# Patient Record
Sex: FEMALE | Race: WHITE | HISPANIC OR LATINO
[De-identification: names, ages, dates, MRNs, and addresses within clinical notes are randomized per-mention and may not be internally consistent; named-entity substitution may affect disease eponyms.]

---

## 2017-01-20 ENCOUNTER — APPOINTMENT (OUTPATIENT)
Dept: OBGYN | Facility: CLINIC | Age: 37
End: 2017-01-20

## 2017-02-03 ENCOUNTER — OTHER (OUTPATIENT)
Age: 37
End: 2017-02-03

## 2017-02-03 ENCOUNTER — APPOINTMENT (OUTPATIENT)
Dept: OBGYN | Facility: CLINIC | Age: 37
End: 2017-02-03

## 2017-02-05 ENCOUNTER — TRANSCRIPTION ENCOUNTER (OUTPATIENT)
Age: 37
End: 2017-02-05

## 2017-02-10 ENCOUNTER — APPOINTMENT (OUTPATIENT)
Dept: OBGYN | Facility: CLINIC | Age: 37
End: 2017-02-10

## 2017-03-24 ENCOUNTER — APPOINTMENT (OUTPATIENT)
Dept: OBGYN | Facility: CLINIC | Age: 37
End: 2017-03-24

## 2017-04-25 ENCOUNTER — RECORD ABSTRACTING (OUTPATIENT)
Age: 37
End: 2017-04-25

## 2017-04-25 DIAGNOSIS — Z87.59 PERSONAL HISTORY OF OTHER COMPLICATIONS OF PREGNANCY, CHILDBIRTH AND THE PUERPERIUM: ICD-10-CM

## 2017-04-25 DIAGNOSIS — B36.9 SUPERFICIAL MYCOSIS, UNSPECIFIED: ICD-10-CM

## 2017-04-25 DIAGNOSIS — Z12.72 ENCOUNTER FOR SCREENING FOR MALIGNANT NEOPLASM OF VAGINA: ICD-10-CM

## 2017-05-05 ENCOUNTER — APPOINTMENT (OUTPATIENT)
Dept: OBGYN | Facility: CLINIC | Age: 37
End: 2017-05-05

## 2017-07-14 ENCOUNTER — APPOINTMENT (OUTPATIENT)
Dept: OBGYN | Facility: CLINIC | Age: 37
End: 2017-07-14

## 2018-01-02 ENCOUNTER — TRANSCRIPTION ENCOUNTER (OUTPATIENT)
Age: 38
End: 2018-01-02

## 2018-02-05 ENCOUNTER — TRANSCRIPTION ENCOUNTER (OUTPATIENT)
Age: 38
End: 2018-02-05

## 2018-02-06 ENCOUNTER — TRANSCRIPTION ENCOUNTER (OUTPATIENT)
Age: 38
End: 2018-02-06

## 2018-02-07 ENCOUNTER — EMERGENCY (EMERGENCY)
Facility: HOSPITAL | Age: 38
LOS: 0 days | Discharge: HOME | End: 2018-02-07

## 2018-02-07 VITALS
OXYGEN SATURATION: 95 % | HEART RATE: 64 BPM | DIASTOLIC BLOOD PRESSURE: 92 MMHG | RESPIRATION RATE: 20 BRPM | TEMPERATURE: 97 F | SYSTOLIC BLOOD PRESSURE: 192 MMHG

## 2018-02-07 DIAGNOSIS — R51 HEADACHE: ICD-10-CM

## 2018-02-07 DIAGNOSIS — R09.81 NASAL CONGESTION: ICD-10-CM

## 2018-02-07 DIAGNOSIS — I10 ESSENTIAL (PRIMARY) HYPERTENSION: ICD-10-CM

## 2018-02-07 DIAGNOSIS — Z79.1 LONG TERM (CURRENT) USE OF NON-STEROIDAL ANTI-INFLAMMATORIES (NSAID): ICD-10-CM

## 2018-02-07 DIAGNOSIS — F17.200 NICOTINE DEPENDENCE, UNSPECIFIED, UNCOMPLICATED: ICD-10-CM

## 2018-02-07 DIAGNOSIS — Z79.899 OTHER LONG TERM (CURRENT) DRUG THERAPY: ICD-10-CM

## 2018-02-07 DIAGNOSIS — R05 COUGH: ICD-10-CM

## 2018-02-07 RX ORDER — KETOROLAC TROMETHAMINE 30 MG/ML
30 SYRINGE (ML) INJECTION ONCE
Qty: 0 | Refills: 0 | Status: DISCONTINUED | OUTPATIENT
Start: 2018-02-07 | End: 2018-02-07

## 2018-02-07 RX ORDER — ONDANSETRON 8 MG/1
8 TABLET, FILM COATED ORAL ONCE
Qty: 0 | Refills: 0 | Status: COMPLETED | OUTPATIENT
Start: 2018-02-07 | End: 2018-02-07

## 2018-02-07 RX ORDER — VALACYCLOVIR 500 MG/1
1 TABLET, FILM COATED ORAL
Qty: 21 | Refills: 0 | OUTPATIENT
Start: 2018-02-07 | End: 2018-02-13

## 2018-02-07 RX ORDER — IBUPROFEN 200 MG
1 TABLET ORAL
Qty: 30 | Refills: 0 | OUTPATIENT
Start: 2018-02-07

## 2018-02-07 RX ADMIN — Medication 30 MILLIGRAM(S): at 22:46

## 2018-02-07 RX ADMIN — ONDANSETRON 8 MILLIGRAM(S): 8 TABLET, FILM COATED ORAL at 22:52

## 2018-02-13 ENCOUNTER — OUTPATIENT (OUTPATIENT)
Dept: OUTPATIENT SERVICES | Facility: HOSPITAL | Age: 38
LOS: 1 days | Discharge: HOME | End: 2018-02-13

## 2018-02-13 DIAGNOSIS — Z00.00 ENCOUNTER FOR GENERAL ADULT MEDICAL EXAMINATION WITHOUT ABNORMAL FINDINGS: ICD-10-CM

## 2018-02-13 DIAGNOSIS — Z13.1 ENCOUNTER FOR SCREENING FOR DIABETES MELLITUS: ICD-10-CM

## 2018-02-17 ENCOUNTER — OUTPATIENT (OUTPATIENT)
Dept: OUTPATIENT SERVICES | Facility: HOSPITAL | Age: 38
LOS: 1 days | Discharge: HOME | End: 2018-02-17

## 2018-02-17 DIAGNOSIS — D64.9 ANEMIA, UNSPECIFIED: ICD-10-CM

## 2018-02-19 ENCOUNTER — OUTPATIENT (OUTPATIENT)
Dept: OUTPATIENT SERVICES | Facility: HOSPITAL | Age: 38
LOS: 1 days | Discharge: HOME | End: 2018-02-19

## 2018-02-19 DIAGNOSIS — D64.9 ANEMIA, UNSPECIFIED: ICD-10-CM

## 2018-04-15 ENCOUNTER — EMERGENCY (EMERGENCY)
Facility: HOSPITAL | Age: 38
LOS: 0 days | Discharge: HOME | End: 2018-04-15
Attending: EMERGENCY MEDICINE

## 2018-04-15 VITALS
SYSTOLIC BLOOD PRESSURE: 176 MMHG | TEMPERATURE: 97 F | RESPIRATION RATE: 17 BRPM | DIASTOLIC BLOOD PRESSURE: 81 MMHG | HEART RATE: 72 BPM | OXYGEN SATURATION: 100 %

## 2018-04-15 VITALS
SYSTOLIC BLOOD PRESSURE: 200 MMHG | HEART RATE: 65 BPM | RESPIRATION RATE: 18 BRPM | OXYGEN SATURATION: 100 % | TEMPERATURE: 98 F | DIASTOLIC BLOOD PRESSURE: 99 MMHG

## 2018-04-15 DIAGNOSIS — Z79.899 OTHER LONG TERM (CURRENT) DRUG THERAPY: ICD-10-CM

## 2018-04-15 DIAGNOSIS — R10.12 LEFT UPPER QUADRANT PAIN: ICD-10-CM

## 2018-04-15 DIAGNOSIS — I10 ESSENTIAL (PRIMARY) HYPERTENSION: ICD-10-CM

## 2018-04-15 DIAGNOSIS — R10.13 EPIGASTRIC PAIN: ICD-10-CM

## 2018-04-15 DIAGNOSIS — Z3A.01 LESS THAN 8 WEEKS GESTATION OF PREGNANCY: ICD-10-CM

## 2018-04-15 DIAGNOSIS — O26.891 OTHER SPECIFIED PREGNANCY RELATED CONDITIONS, FIRST TRIMESTER: ICD-10-CM

## 2018-04-15 DIAGNOSIS — Z79.1 LONG TERM (CURRENT) USE OF NON-STEROIDAL ANTI-INFLAMMATORIES (NSAID): ICD-10-CM

## 2018-04-15 LAB
ALBUMIN SERPL ELPH-MCNC: 3.5 G/DL — SIGNIFICANT CHANGE UP (ref 3.5–5.2)
ALP SERPL-CCNC: 56 U/L — SIGNIFICANT CHANGE UP (ref 30–115)
ALT FLD-CCNC: 18 U/L — SIGNIFICANT CHANGE UP (ref 0–41)
ANION GAP SERPL CALC-SCNC: 10 MMOL/L — SIGNIFICANT CHANGE UP (ref 7–14)
APPEARANCE UR: CLEAR — SIGNIFICANT CHANGE UP
AST SERPL-CCNC: 27 U/L — SIGNIFICANT CHANGE UP (ref 0–41)
BASOPHILS # BLD AUTO: 0.01 K/UL — SIGNIFICANT CHANGE UP (ref 0–0.2)
BASOPHILS NFR BLD AUTO: 0.2 % — SIGNIFICANT CHANGE UP (ref 0–1)
BILIRUB DIRECT SERPL-MCNC: <0.2 MG/DL — SIGNIFICANT CHANGE UP (ref 0–0.2)
BILIRUB DIRECT SERPL-MCNC: <0.2 MG/DL — SIGNIFICANT CHANGE UP (ref 0–0.2)
BILIRUB INDIRECT FLD-MCNC: >0.1 MG/DL — LOW (ref 0.2–1.2)
BILIRUB INDIRECT FLD-MCNC: >0.1 MG/DL — LOW (ref 0.2–1.2)
BILIRUB SERPL-MCNC: 0.3 MG/DL — SIGNIFICANT CHANGE UP (ref 0.2–1.2)
BILIRUB SERPL-MCNC: 0.3 MG/DL — SIGNIFICANT CHANGE UP (ref 0.2–1.2)
BILIRUB UR-MCNC: NEGATIVE — SIGNIFICANT CHANGE UP
BUN SERPL-MCNC: 11 MG/DL — SIGNIFICANT CHANGE UP (ref 10–20)
CALCIUM SERPL-MCNC: 8.7 MG/DL — SIGNIFICANT CHANGE UP (ref 8.5–10.1)
CHLORIDE SERPL-SCNC: 101 MMOL/L — SIGNIFICANT CHANGE UP (ref 98–110)
CO2 SERPL-SCNC: 22 MMOL/L — SIGNIFICANT CHANGE UP (ref 17–32)
COLOR SPEC: YELLOW — SIGNIFICANT CHANGE UP
CREAT SERPL-MCNC: 0.9 MG/DL — SIGNIFICANT CHANGE UP (ref 0.7–1.5)
DIFF PNL FLD: NEGATIVE — SIGNIFICANT CHANGE UP
EOSINOPHIL # BLD AUTO: 0.03 K/UL — SIGNIFICANT CHANGE UP (ref 0–0.7)
EOSINOPHIL NFR BLD AUTO: 0.6 % — SIGNIFICANT CHANGE UP (ref 0–8)
GLUCOSE SERPL-MCNC: 100 MG/DL — HIGH (ref 70–99)
GLUCOSE UR QL: NEGATIVE MG/DL — SIGNIFICANT CHANGE UP
HCT VFR BLD CALC: 36.5 % — LOW (ref 37–47)
HGB BLD-MCNC: 12.1 G/DL — SIGNIFICANT CHANGE UP (ref 12–16)
IMM GRANULOCYTES NFR BLD AUTO: 0 % — LOW (ref 0.1–0.3)
KETONES UR-MCNC: NEGATIVE — SIGNIFICANT CHANGE UP
LACTATE SERPL-SCNC: 1.3 MMOL/L — SIGNIFICANT CHANGE UP (ref 0.5–2.2)
LEUKOCYTE ESTERASE UR-ACNC: NEGATIVE — SIGNIFICANT CHANGE UP
LIDOCAIN IGE QN: 34 U/L — SIGNIFICANT CHANGE UP (ref 7–60)
LYMPHOCYTES # BLD AUTO: 2.46 K/UL — SIGNIFICANT CHANGE UP (ref 1.2–3.4)
LYMPHOCYTES # BLD AUTO: 49.2 % — SIGNIFICANT CHANGE UP (ref 20.5–51.1)
MCHC RBC-ENTMCNC: 27.4 PG — SIGNIFICANT CHANGE UP (ref 27–31)
MCHC RBC-ENTMCNC: 33.2 G/DL — SIGNIFICANT CHANGE UP (ref 32–37)
MCV RBC AUTO: 82.8 FL — SIGNIFICANT CHANGE UP (ref 81–99)
MONOCYTES # BLD AUTO: 0.38 K/UL — SIGNIFICANT CHANGE UP (ref 0.1–0.6)
MONOCYTES NFR BLD AUTO: 7.6 % — SIGNIFICANT CHANGE UP (ref 1.7–9.3)
NEUTROPHILS # BLD AUTO: 2.12 K/UL — SIGNIFICANT CHANGE UP (ref 1.4–6.5)
NEUTROPHILS NFR BLD AUTO: 42.4 % — SIGNIFICANT CHANGE UP (ref 42.2–75.2)
NITRITE UR-MCNC: NEGATIVE — SIGNIFICANT CHANGE UP
NRBC # BLD: 0 /100 WBCS — SIGNIFICANT CHANGE UP (ref 0–0)
PH UR: 6.5 — SIGNIFICANT CHANGE UP (ref 5–8)
PLATELET # BLD AUTO: 207 K/UL — SIGNIFICANT CHANGE UP (ref 130–400)
POTASSIUM SERPL-MCNC: 4.5 MMOL/L — SIGNIFICANT CHANGE UP (ref 3.5–5)
POTASSIUM SERPL-SCNC: 4.5 MMOL/L — SIGNIFICANT CHANGE UP (ref 3.5–5)
PROT SERPL-MCNC: 9.4 G/DL — HIGH (ref 6–8)
PROT UR-MCNC: (no result) MG/DL
RBC # BLD: 4.41 M/UL — SIGNIFICANT CHANGE UP (ref 4.2–5.4)
RBC # FLD: 14.3 % — SIGNIFICANT CHANGE UP (ref 11.5–14.5)
SODIUM SERPL-SCNC: 133 MMOL/L — LOW (ref 135–146)
SP GR SPEC: 1.02 — SIGNIFICANT CHANGE UP (ref 1.01–1.03)
TROPONIN T SERPL-MCNC: <0.01 NG/ML — SIGNIFICANT CHANGE UP
UROBILINOGEN FLD QL: 0.2 MG/DL — SIGNIFICANT CHANGE UP (ref 0.2–0.2)
WBC # BLD: 5 K/UL — SIGNIFICANT CHANGE UP (ref 4.8–10.8)
WBC # FLD AUTO: 5 K/UL — SIGNIFICANT CHANGE UP (ref 4.8–10.8)

## 2018-04-15 NOTE — ED PROVIDER NOTE - NS ED ROS FT
Eyes:  No visual changes, eye pain or discharge.  ENMT:  No hearing changes, pain, discharge or infections. No neck pain or stiffness.  Cardiac:  No chest pain, SOB or edema.  Respiratory:  No cough or respiratory distress.   GI: Epigastric and LUQ abd pain.  No nausea, vomiting, diarrhea   :  No dysuria, frequency or burning.  MS:  No myalgia, muscle weakness, joint pain or back pain.  Neuro:  No headache or weakness.  No LOC.  Skin:  No skin rash.

## 2018-04-15 NOTE — ED PROVIDER NOTE - OBJECTIVE STATEMENT
38 y f , 6 weeks pregnant by lmp, htn pw abd pain. Epigastric and L upper quadrant abdominal pain. Sharp, intermittent. No exacerbating or alleviating factors. No change with eating. No loss of vaginal fluid, cramping, vaginal bleeding. Denies fever, chills, n/v, dysuria, hematuria, back pain, flank pain, headache, cp, sob.

## 2018-04-15 NOTE — ED PROVIDER NOTE - PHYSICAL EXAMINATION
CONSTITUTIONAL: Well-developed; well-nourished; in no acute distress.   SKIN: warm, dry  HEAD: Normocephalic; atraumatic.  EYES: normal sclera and conjunctiva   ENT: No nasal discharge; airway clear.  NECK: Supple; non tender.  CARD: S1, S2 normal; no murmurs, gallops, or rubs. Regular rate and rhythm.   RESP: No wheezes, rales or rhonchi.  ABD: soft ntnd, negative mcdermott's sign. No rebound, no guarding. No suprapubic ttp  EXT: Normal ROM.  No clubbing, cyanosis or edema.   LYMPH: No acute cervical adenopathy.  NEURO: Alert, oriented, grossly unremarkable  PSYCH: Cooperative, appropriate.

## 2018-04-16 NOTE — ED ADULT NURSE NOTE - OBJECTIVE STATEMENT
Pt took 2 home pregnancy tests with positive results. Pt complaining of abominal pain starting today.

## 2018-05-02 ENCOUNTER — APPOINTMENT (OUTPATIENT)
Dept: ANTEPARTUM | Facility: CLINIC | Age: 38
End: 2018-05-02

## 2018-05-02 ENCOUNTER — OUTPATIENT (OUTPATIENT)
Dept: OUTPATIENT SERVICES | Facility: HOSPITAL | Age: 38
LOS: 1 days | Discharge: HOME | End: 2018-05-02

## 2018-05-02 VITALS
TEMPERATURE: 97.8 F | DIASTOLIC BLOOD PRESSURE: 92 MMHG | HEART RATE: 68 BPM | SYSTOLIC BLOOD PRESSURE: 201 MMHG | OXYGEN SATURATION: 96 %

## 2018-05-02 DIAGNOSIS — Z30.42 ENCOUNTER FOR SURVEILLANCE OF INJECTABLE CONTRACEPTIVE: ICD-10-CM

## 2018-05-02 DIAGNOSIS — Z83.79 FAMILY HISTORY OF OTHER DISEASES OF THE DIGESTIVE SYSTEM: ICD-10-CM

## 2018-05-02 DIAGNOSIS — Z86.19 PERSONAL HISTORY OF OTHER INFECTIOUS AND PARASITIC DISEASES: ICD-10-CM

## 2018-05-02 LAB
BILIRUB UR QL STRIP: NEGATIVE
CLARITY UR: CLEAR
COLLECTION METHOD: NORMAL
GLUCOSE UR-MCNC: NEGATIVE
HCG UR QL: 0.2 EU/DL
HGB UR QL STRIP.AUTO: NEGATIVE
KETONES UR-MCNC: NEGATIVE
LEUKOCYTE ESTERASE UR QL STRIP: NEGATIVE
NITRITE UR QL STRIP: NEGATIVE
PH UR STRIP: 6
PROT UR STRIP-MCNC: NORMAL
SCHEDULED VISIT: YES
SP GR UR STRIP: 1.01
URINE ALBUMIN/PROTEIN: NORMAL
URINE GLUCOSE: NEGATIVE
URINE KETONES: NEGATIVE

## 2018-05-02 RX ORDER — MEDROXYPROGESTERONE ACETATE 150 MG/ML
150 INJECTION, SUSPENSION INTRAMUSCULAR
Refills: 0 | Status: DISCONTINUED | COMMUNITY
End: 2018-05-02

## 2018-05-03 DIAGNOSIS — O30.001 TWIN PREGNANCY, UNSPECIFIED NUMBER OF PLACENTA AND UNSPECIFIED NUMBER OF AMNIOTIC SACS, FIRST TRIMESTER: ICD-10-CM

## 2018-05-03 DIAGNOSIS — O98.711 HUMAN IMMUNODEFICIENCY VIRUS [HIV] DISEASE COMPLICATING PREGNANCY, FIRST TRIMESTER: ICD-10-CM

## 2018-05-03 DIAGNOSIS — O10.011 PRE-EXISTING ESSENTIAL HYPERTENSION COMPLICATING PREGNANCY, FIRST TRIMESTER: ICD-10-CM

## 2018-05-04 ENCOUNTER — OUTPATIENT (OUTPATIENT)
Dept: OUTPATIENT SERVICES | Facility: HOSPITAL | Age: 38
LOS: 1 days | Discharge: HOME | End: 2018-05-04

## 2018-05-04 DIAGNOSIS — B20 HUMAN IMMUNODEFICIENCY VIRUS [HIV] DISEASE: ICD-10-CM

## 2018-05-04 DIAGNOSIS — E66.8 OTHER OBESITY: ICD-10-CM

## 2018-05-04 DIAGNOSIS — I10 ESSENTIAL (PRIMARY) HYPERTENSION: ICD-10-CM

## 2018-05-04 DIAGNOSIS — O09.90 SUPERVISION OF HIGH RISK PREGNANCY, UNSPECIFIED, UNSPECIFIED TRIMESTER: ICD-10-CM

## 2018-05-04 LAB
24R-OH-CALCIDIOL SERPL-MCNC: 24 NG/ML — LOW (ref 30–80)
ALBUMIN SERPL ELPH-MCNC: 3.5 G/DL — SIGNIFICANT CHANGE UP (ref 3.5–5.2)
ALP SERPL-CCNC: 49 U/L — SIGNIFICANT CHANGE UP (ref 30–115)
ALT FLD-CCNC: 18 U/L — SIGNIFICANT CHANGE UP (ref 0–41)
ANION GAP SERPL CALC-SCNC: 11 MMOL/L — SIGNIFICANT CHANGE UP (ref 7–14)
APPEARANCE UR: CLEAR — SIGNIFICANT CHANGE UP
AST SERPL-CCNC: 20 U/L — SIGNIFICANT CHANGE UP (ref 0–41)
BACTERIA # UR AUTO: (no result)
BILIRUB SERPL-MCNC: 0.4 MG/DL — SIGNIFICANT CHANGE UP (ref 0.2–1.2)
BILIRUB UR-MCNC: NEGATIVE — SIGNIFICANT CHANGE UP
BUN SERPL-MCNC: 10 MG/DL — SIGNIFICANT CHANGE UP (ref 10–20)
CALCIUM SERPL-MCNC: 8.7 MG/DL — SIGNIFICANT CHANGE UP (ref 8.5–10.1)
CHLORIDE SERPL-SCNC: 99 MMOL/L — SIGNIFICANT CHANGE UP (ref 98–110)
CHOLEST SERPL-MCNC: 149 MG/DL — SIGNIFICANT CHANGE UP (ref 100–200)
CO2 SERPL-SCNC: 24 MMOL/L — SIGNIFICANT CHANGE UP (ref 17–32)
COLOR SPEC: YELLOW — SIGNIFICANT CHANGE UP
CREAT SERPL-MCNC: 0.7 MG/DL — SIGNIFICANT CHANGE UP (ref 0.7–1.5)
DIFF PNL FLD: NEGATIVE — SIGNIFICANT CHANGE UP
DRUG SCREEN 1, URINE RESULT: SIGNIFICANT CHANGE UP
EPI CELLS # UR: (no result) /HPF
ESTIMATED AVERAGE GLUCOSE: 111 MG/DL — SIGNIFICANT CHANGE UP (ref 68–114)
GGT SERPL-CCNC: 13 U/L — SIGNIFICANT CHANGE UP (ref 1–40)
GLUCOSE SERPL-MCNC: 91 MG/DL — SIGNIFICANT CHANGE UP (ref 70–99)
GLUCOSE UR QL: NEGATIVE MG/DL — SIGNIFICANT CHANGE UP
HBA1C BLD-MCNC: 5.5 % — SIGNIFICANT CHANGE UP (ref 4–5.6)
HCG UR QL: POSITIVE — SIGNIFICANT CHANGE UP
HCT VFR BLD CALC: 36 % — LOW (ref 37–47)
HDLC SERPL-MCNC: 47 MG/DL — SIGNIFICANT CHANGE UP (ref 40–125)
HGB BLD-MCNC: 12.1 G/DL — SIGNIFICANT CHANGE UP (ref 12–16)
KETONES UR-MCNC: NEGATIVE — SIGNIFICANT CHANGE UP
LDH SERPL L TO P-CCNC: 204 — SIGNIFICANT CHANGE UP (ref 50–242)
LEUKOCYTE ESTERASE UR-ACNC: NEGATIVE — SIGNIFICANT CHANGE UP
LIPID PNL WITH DIRECT LDL SERPL: 84 MG/DL — SIGNIFICANT CHANGE UP (ref 4–129)
MCHC RBC-ENTMCNC: 27.9 PG — SIGNIFICANT CHANGE UP (ref 27–31)
MCHC RBC-ENTMCNC: 33.6 G/DL — SIGNIFICANT CHANGE UP (ref 32–37)
MCV RBC AUTO: 83.1 FL — SIGNIFICANT CHANGE UP (ref 81–99)
NITRITE UR-MCNC: NEGATIVE — SIGNIFICANT CHANGE UP
NRBC # BLD: 0 /100 WBCS — SIGNIFICANT CHANGE UP (ref 0–0)
PH UR: 6.5 — SIGNIFICANT CHANGE UP (ref 5–8)
PLATELET # BLD AUTO: 219 K/UL — SIGNIFICANT CHANGE UP (ref 130–400)
POTASSIUM SERPL-MCNC: 4.1 MMOL/L — SIGNIFICANT CHANGE UP (ref 3.5–5)
POTASSIUM SERPL-SCNC: 4.1 MMOL/L — SIGNIFICANT CHANGE UP (ref 3.5–5)
PROT SERPL-MCNC: 8.9 G/DL — HIGH (ref 6–8)
PROT UR-MCNC: (no result) MG/DL
RBC # BLD: 4.33 M/UL — SIGNIFICANT CHANGE UP (ref 4.2–5.4)
RBC # FLD: 14 % — SIGNIFICANT CHANGE UP (ref 11.5–14.5)
SODIUM SERPL-SCNC: 134 MMOL/L — LOW (ref 135–146)
SP GR SPEC: 1.02 — SIGNIFICANT CHANGE UP (ref 1.01–1.03)
TOTAL CHOLESTEROL/HDL RATIO MEASUREMENT: 3.2 RATIO — LOW (ref 4–5.5)
TRIGL SERPL-MCNC: 151 MG/DL — HIGH (ref 10–149)
UROBILINOGEN FLD QL: 0.2 MG/DL — SIGNIFICANT CHANGE UP (ref 0.2–0.2)
WBC # BLD: 4.66 K/UL — LOW (ref 4.8–10.8)
WBC # FLD AUTO: 4.66 K/UL — LOW (ref 4.8–10.8)
WBC UR QL: SIGNIFICANT CHANGE UP /HPF

## 2018-05-05 LAB
4/8 RATIO: 0.4 RATIO — LOW (ref 1.2–3.4)
ABS CD8: 1276 /UL — HIGH (ref 206–494)
AMPHET UR-MCNC: NEGATIVE — SIGNIFICANT CHANGE UP
BARBITURATES UR SCN-MCNC: NEGATIVE — SIGNIFICANT CHANGE UP
BENZODIAZ UR-MCNC: NEGATIVE — SIGNIFICANT CHANGE UP
CD16+CD56+ CELLS NFR BLD: 5 % — SIGNIFICANT CHANGE UP (ref 4–20)
CD16+CD56+ CELLS NFR SPEC: 102 /UL — SIGNIFICANT CHANGE UP (ref 89–385)
CD19 BLASTS SPEC-ACNC: 181 /UL — SIGNIFICANT CHANGE UP (ref 72–502)
CD19 BLASTS SPEC-ACNC: 9 % — LOW (ref 10–28)
CD3 BLASTS SPEC-ACNC: 1776 /UL — SIGNIFICANT CHANGE UP (ref 800–2012)
CD3 BLASTS SPEC-ACNC: 86 % — HIGH (ref 59–81)
CD4 %: 23 % — SIGNIFICANT CHANGE UP (ref 42–58)
CD8 %: 61 % — SIGNIFICANT CHANGE UP (ref 14–30)
CMV IGG FLD QL: >10 U/ML — HIGH
CMV IGG SERPL-IMP: POSITIVE
COCAINE METAB.OTHER UR-MCNC: NEGATIVE — SIGNIFICANT CHANGE UP
HAV IGG SER QL IA: REACTIVE
HAV IGM SER-ACNC: SIGNIFICANT CHANGE UP
HBV CORE AB SER-ACNC: SIGNIFICANT CHANGE UP
HBV SURFACE AB SER-ACNC: REACTIVE
HBV SURFACE AG SER-ACNC: SIGNIFICANT CHANGE UP
HCV AB S/CO SERPL IA: 0.14 S/CO — SIGNIFICANT CHANGE UP
HCV AB SERPL-IMP: SIGNIFICANT CHANGE UP
HSV1 IGG SER-ACNC: 0.86 INDEX — SIGNIFICANT CHANGE UP
HSV1 IGG SERPL QL IA: NEGATIVE — SIGNIFICANT CHANGE UP
HSV2 IGG FLD-ACNC: 0.13 INDEX — SIGNIFICANT CHANGE UP
HSV2 IGG SERPL QL IA: NEGATIVE — SIGNIFICANT CHANGE UP
METHADONE UR-MCNC: NEGATIVE — SIGNIFICANT CHANGE UP
MEV IGG SER-ACNC: 174 AU/ML — SIGNIFICANT CHANGE UP
MEV IGG+IGM SER-IMP: POSITIVE — SIGNIFICANT CHANGE UP
MUV AB SER-ACNC: POSITIVE — SIGNIFICANT CHANGE UP
MUV IGG FLD-ACNC: 62.7 AU/ML — SIGNIFICANT CHANGE UP
OPIATES UR-MCNC: NEGATIVE — SIGNIFICANT CHANGE UP
PCP UR-MCNC: NEGATIVE — SIGNIFICANT CHANGE UP
PROPOXYPHENE QUALITATIVE URINE RESULT: NEGATIVE — SIGNIFICANT CHANGE UP
RUBV IGG SER-ACNC: 1.7 INDEX — SIGNIFICANT CHANGE UP
RUBV IGG SER-IMP: POSITIVE — SIGNIFICANT CHANGE UP
T GONDII IGG SER QL: <3 IU/ML — SIGNIFICANT CHANGE UP
T GONDII IGG SER QL: NEGATIVE — SIGNIFICANT CHANGE UP
T PALLIDUM AB TITR SER: NEGATIVE — SIGNIFICANT CHANGE UP
T-CELL CD4 SUBSET PNL BLD: 484 /UL — LOW (ref 495–1312)
THC UR QL: NEGATIVE — SIGNIFICANT CHANGE UP
VZV IGG FLD QL IA: 748.7 INDEX — SIGNIFICANT CHANGE UP
VZV IGG FLD QL IA: POSITIVE — SIGNIFICANT CHANGE UP

## 2018-05-06 LAB
M TB TUBERC IFN-G BLD QL: 0 IU/ML — SIGNIFICANT CHANGE UP
M TB TUBERC IFN-G BLD QL: 0.06 IU/ML — SIGNIFICANT CHANGE UP
M TB TUBERC IFN-G BLD QL: NEGATIVE — SIGNIFICANT CHANGE UP
MITOGEN IGNF BCKGRD COR BLD-ACNC: >10 IU/ML — SIGNIFICANT CHANGE UP

## 2018-05-07 ENCOUNTER — LABORATORY RESULT (OUTPATIENT)
Age: 38
End: 2018-05-07

## 2018-05-07 LAB
C TRACH RRNA SPEC QL NAA+PROBE: SIGNIFICANT CHANGE UP
HIV 1+2 AB+HIV1 P24 AG SERPL QL IA: REACTIVE
HIV1+2 AB SPEC QL: (no result)
HIV1+2 AB SPEC QL: SIGNIFICANT CHANGE UP
SPECIMEN SOURCE: SIGNIFICANT CHANGE UP

## 2018-05-09 LAB
A VAGINAE DNA VAG QL NAA+PROBE: ABNORMAL
ALBUMIN SERPL ELPH-MCNC: 3.6 G/DL
ALP BLD-CCNC: 43 U/L
ALT SERPL-CCNC: 18 U/L
ANION GAP SERPL CALC-SCNC: 13 MMOL/L
APPEARANCE: CLEAR
AST SERPL-CCNC: 24 U/L
BACTERIA UR CULT: NORMAL
BASOPHILS # BLD AUTO: 0.01 K/UL
BASOPHILS NFR BLD AUTO: 0.2 %
BILIRUB SERPL-MCNC: 0.5 MG/DL
BILIRUBIN URINE: NEGATIVE
BLOOD URINE: NEGATIVE
BUN SERPL-MCNC: 9 MG/DL
BVAB2 DNA VAG QL NAA+PROBE: NORMAL
C KRUSEI DNA VAG QL NAA+PROBE: NEGATIVE
C TRACH RRNA SPEC QL NAA+PROBE: NEGATIVE
CALCIUM SERPL-MCNC: 9.3 MG/DL
CHLORIDE SERPL-SCNC: 99 MMOL/L
CMV IGG SERPL QL: >10 U/ML
CMV IGG SERPL-IMP: POSITIVE
CMV IGM SERPL QL: <8 AU/ML
CMV IGM SERPL QL: NEGATIVE
CO2 SERPL-SCNC: 22 MMOL/L
COLOR: YELLOW
CREAT 24H UR-MCNC: 2 MG/24 H
CREAT ?TM UR-MCNC: 287 MG/DL
CREAT SERPL-MCNC: 0.8 MG/DL
EOSINOPHIL # BLD AUTO: 0.02 K/UL
EOSINOPHIL NFR BLD AUTO: 0.4 %
GLUCOSE QUALITATIVE U: NEGATIVE MG/DL
GLUCOSE SERPL-MCNC: 91 MG/DL
HBV SURFACE AG SERPL QL IA: NONREACTIVE
HCT VFR BLD CALC: 34.6 %
HCV AB SER QL: NONREACTIVE
HCV S/CO RATIO: 0.16 S/CO
HGB BLD-MCNC: 11.3 G/DL
IMM GRANULOCYTES NFR BLD AUTO: 0 %
KETONES URINE: ABNORMAL
LDH SERPL-CCNC: 184
LEUKOCYTE ESTERASE URINE: NEGATIVE
LYMPHOCYTES # BLD AUTO: 1.97 K/UL
LYMPHOCYTES NFR BLD AUTO: 41.4 %
MAN DIFF?: NORMAL
MCHC RBC-ENTMCNC: 27.8 PG
MCHC RBC-ENTMCNC: 32.7 G/DL
MCV RBC AUTO: 85 FL
MEGA1 DNA VAG QL NAA+PROBE: ABNORMAL
MONOCYTES # BLD AUTO: 0.32 K/UL
MONOCYTES NFR BLD AUTO: 6.7 %
N GONORRHOEA RRNA SPEC QL NAA+PROBE: NEGATIVE
NEUTROPHILS # BLD AUTO: 2.44 K/UL
NEUTROPHILS NFR BLD AUTO: 51.3 %
NITRITE URINE: NEGATIVE
PH URINE: 6
PLATELET # BLD AUTO: 238 K/UL
POTASSIUM SERPL-SCNC: 4.4 MMOL/L
PROT 24H UR-MRATE: 51 MG/DL
PROT ?TM UR-MCNC: 24 HR
PROT SERPL-MCNC: 9 G/DL
PROT UR-MCNC: 268 MG/24 H
PROTEIN URINE: ABNORMAL MG/DL
RBC # BLD: 4.07 M/UL
RBC # FLD: 14.4 %
RUBV IGG FLD-ACNC: 1.6 INDEX
RUBV IGG SER-IMP: POSITIVE
SODIUM SERPL-SCNC: 134 MMOL/L
SPECIFIC GRAVITY URINE: 1.02
SPECIMEN VOL 24H UR: 525 ML
T GONDII AB SER-IMP: NEGATIVE
T GONDII AB SER-IMP: NEGATIVE
T GONDII IGG SER QL: <3 IU/ML
T GONDII IGM SER QL: <3 AU/ML
T PALLIDUM AB SER QL IA: NEGATIVE
T VAGINALIS RRNA SPEC QL NAA+PROBE: NEGATIVE
URATE SERPL-MCNC: 3.5 MG/DL
UROBILINOGEN URINE: NEGATIVE MG/DL
VZV AB TITR SER: POSITIVE
VZV IGG SER IF-ACNC: 611.9 INDEX
WBC # FLD AUTO: 4.76 K/UL

## 2018-05-11 LAB
ADJUSTED MITOGEN: >10 IU/ML
ADJUSTED TB AG: -0.01 IU/ML
HGB A MFR BLD: 97.4 %
HGB A2 MFR BLD: 2.6 %
HGB FRACT BLD-IMP: NORMAL
HIV1 RNA # SERPL NAA+PROBE: SIGNIFICANT CHANGE UP
HIV1 RNA SERPL NAA+PROBE-LOG#: 2.59 LG COP/ML — SIGNIFICANT CHANGE UP
LEAD BLD-MCNC: 1 UG/DL
M TB IFN-G BLD-IMP: NEGATIVE
QUANTIFERON GOLD NIL: 0.04 IU/ML

## 2018-05-16 ENCOUNTER — OUTPATIENT (OUTPATIENT)
Dept: OUTPATIENT SERVICES | Facility: HOSPITAL | Age: 38
LOS: 1 days | Discharge: HOME | End: 2018-05-16

## 2018-05-16 DIAGNOSIS — O10.919 UNSPECIFIED PRE-EXISTING HYPERTENSION COMPLICATING PREGNANCY, UNSPECIFIED TRIMESTER: ICD-10-CM

## 2018-05-16 LAB
HIV1 RNA # SERPL NAA+PROBE: 83 COPIES/ML
VIRAL LOAD LOG: 1.92 LG COP/ML

## 2018-05-24 ENCOUNTER — MEDICATION RENEWAL (OUTPATIENT)
Age: 38
End: 2018-05-24

## 2018-06-01 ENCOUNTER — OUTPATIENT (OUTPATIENT)
Dept: OUTPATIENT SERVICES | Facility: HOSPITAL | Age: 38
LOS: 1 days | Discharge: HOME | End: 2018-06-01

## 2018-06-01 ENCOUNTER — APPOINTMENT (OUTPATIENT)
Dept: OBGYN | Facility: CLINIC | Age: 38
End: 2018-06-01

## 2018-06-01 ENCOUNTER — APPOINTMENT (OUTPATIENT)
Dept: ANTEPARTUM | Facility: CLINIC | Age: 38
End: 2018-06-01

## 2018-06-01 VITALS
WEIGHT: 292.5 LBS | OXYGEN SATURATION: 97 % | BODY MASS INDEX: 45.81 KG/M2 | HEART RATE: 66 BPM | TEMPERATURE: 97.2 F | DIASTOLIC BLOOD PRESSURE: 69 MMHG | SYSTOLIC BLOOD PRESSURE: 143 MMHG

## 2018-06-01 DIAGNOSIS — O30.049 TWIN PREGNANCY, DICHORIONIC/DIAMNIOTIC, UNSPECIFIED TRIMESTER: ICD-10-CM

## 2018-06-01 LAB
BILIRUB UR QL STRIP: NORMAL
CLARITY UR: CLEAR
COLLECTION METHOD: NORMAL
GLUCOSE UR-MCNC: NEGATIVE
HCG UR QL: 0.2 EU/DL
HGB UR QL STRIP.AUTO: NEGATIVE
KETONES UR-MCNC: NORMAL
LEUKOCYTE ESTERASE UR QL STRIP: NEGATIVE
NITRITE UR QL STRIP: NEGATIVE
OB COMMENTS: NORMAL
PH UR STRIP: 6
PROT UR STRIP-MCNC: NORMAL
SCHEDULED VISIT: YES
SP GR UR STRIP: 1.02
URINE ALBUMIN/PROTEIN: NORMAL
URINE GLUCOSE: NEGATIVE
URINE KETONES: NEGATIVE
WEEKS GESTATION: NORMAL

## 2018-06-04 ENCOUNTER — APPOINTMENT (OUTPATIENT)
Dept: OTOLARYNGOLOGY | Facility: CLINIC | Age: 38
End: 2018-06-04

## 2018-06-05 ENCOUNTER — APPOINTMENT (OUTPATIENT)
Dept: CARDIOLOGY | Facility: CLINIC | Age: 38
End: 2018-06-05

## 2018-06-05 DIAGNOSIS — O98.711 HUMAN IMMUNODEFICIENCY VIRUS [HIV] DISEASE COMPLICATING PREGNANCY, FIRST TRIMESTER: ICD-10-CM

## 2018-06-05 DIAGNOSIS — Z36.82 ENCOUNTER FOR ANTENATAL SCREENING FOR NUCHAL TRANSLUCENCY: ICD-10-CM

## 2018-06-05 DIAGNOSIS — O09.521 SUPERVISION OF ELDERLY MULTIGRAVIDA, FIRST TRIMESTER: ICD-10-CM

## 2018-06-05 DIAGNOSIS — O09.91 SUPERVISION OF HIGH RISK PREGNANCY, UNSPECIFIED, FIRST TRIMESTER: ICD-10-CM

## 2018-06-05 DIAGNOSIS — Z3A.12 12 WEEKS GESTATION OF PREGNANCY: ICD-10-CM

## 2018-06-05 DIAGNOSIS — O31.20X0: ICD-10-CM

## 2018-06-05 DIAGNOSIS — O10.011 PRE-EXISTING ESSENTIAL HYPERTENSION COMPLICATING PREGNANCY, FIRST TRIMESTER: ICD-10-CM

## 2018-06-15 ENCOUNTER — OTHER (OUTPATIENT)
Age: 38
End: 2018-06-15

## 2018-06-20 ENCOUNTER — OUTPATIENT (OUTPATIENT)
Dept: OUTPATIENT SERVICES | Facility: HOSPITAL | Age: 38
LOS: 1 days | Discharge: HOME | End: 2018-06-20

## 2018-06-20 ENCOUNTER — APPOINTMENT (OUTPATIENT)
Dept: ANTEPARTUM | Facility: CLINIC | Age: 38
End: 2018-06-20

## 2018-06-20 ENCOUNTER — NON-APPOINTMENT (OUTPATIENT)
Age: 38
End: 2018-06-20

## 2018-06-20 VITALS
DIASTOLIC BLOOD PRESSURE: 72 MMHG | BODY MASS INDEX: 45.99 KG/M2 | OXYGEN SATURATION: 96 % | SYSTOLIC BLOOD PRESSURE: 139 MMHG | HEIGHT: 67 IN | HEART RATE: 67 BPM | WEIGHT: 293 LBS | TEMPERATURE: 97.7 F

## 2018-06-20 LAB
BILIRUB UR QL STRIP: NEGATIVE
CLARITY UR: NORMAL
COLLECTION METHOD: NORMAL
FETAL HEART DESCRIPTION: NORMAL
FETAL HEART RATE (BPM): 162
FETAL PRESENTATION: NORMAL
GLUCOSE UR-MCNC: NEGATIVE
HCG UR QL: 0.2 EU/DL
HGB UR QL STRIP.AUTO: NEGATIVE
KETONES UR-MCNC: NORMAL
LEUKOCYTE ESTERASE UR QL STRIP: NEGATIVE
NITRITE UR QL STRIP: NEGATIVE
OB COMMENTS: NORMAL
PH UR STRIP: 6.5
PROT UR STRIP-MCNC: 1
SCHEDULED VISIT: YES
SP GR UR STRIP: 1.01
URINE ALBUMIN/PROTEIN: 1
URINE GLUCOSE: NEGATIVE
URINE KETONES: NORMAL
WEEKS GESTATION: NORMAL

## 2018-06-21 ENCOUNTER — LABORATORY RESULT (OUTPATIENT)
Age: 38
End: 2018-06-21

## 2018-06-21 ENCOUNTER — OUTPATIENT (OUTPATIENT)
Dept: OUTPATIENT SERVICES | Facility: HOSPITAL | Age: 38
LOS: 1 days | Discharge: HOME | End: 2018-06-21

## 2018-06-21 DIAGNOSIS — O09.92 SUPERVISION OF HIGH RISK PREGNANCY, UNSPECIFIED, SECOND TRIMESTER: ICD-10-CM

## 2018-06-21 DIAGNOSIS — O34.12 MATERNAL CARE FOR BENIGN TUMOR OF CORPUS UTERI, SECOND TRIMESTER: ICD-10-CM

## 2018-06-21 DIAGNOSIS — B25.9 CYTOMEGALOVIRAL DISEASE, UNSPECIFIED: ICD-10-CM

## 2018-06-21 DIAGNOSIS — Z3A.15 15 WEEKS GESTATION OF PREGNANCY: ICD-10-CM

## 2018-06-21 DIAGNOSIS — O10.919 UNSPECIFIED PRE-EXISTING HYPERTENSION COMPLICATING PREGNANCY, UNSPECIFIED TRIMESTER: ICD-10-CM

## 2018-06-21 DIAGNOSIS — O31.22X0: ICD-10-CM

## 2018-06-21 DIAGNOSIS — R10.9 UNSPECIFIED ABDOMINAL PAIN: ICD-10-CM

## 2018-06-21 DIAGNOSIS — Z34.90 ENCOUNTER FOR SUPERVISION OF NORMAL PREGNANCY, UNSPECIFIED, UNSPECIFIED TRIMESTER: ICD-10-CM

## 2018-06-29 ENCOUNTER — APPOINTMENT (OUTPATIENT)
Dept: ANTEPARTUM | Facility: CLINIC | Age: 38
End: 2018-06-29

## 2018-06-29 ENCOUNTER — APPOINTMENT (OUTPATIENT)
Dept: OBGYN | Facility: CLINIC | Age: 38
End: 2018-06-29

## 2018-07-16 ENCOUNTER — APPOINTMENT (OUTPATIENT)
Dept: CARDIOLOGY | Facility: CLINIC | Age: 38
End: 2018-07-16

## 2018-07-18 ENCOUNTER — OUTPATIENT (OUTPATIENT)
Dept: OUTPATIENT SERVICES | Facility: HOSPITAL | Age: 38
LOS: 1 days | Discharge: HOME | End: 2018-07-18

## 2018-07-18 ENCOUNTER — LABORATORY RESULT (OUTPATIENT)
Age: 38
End: 2018-07-18

## 2018-07-18 ENCOUNTER — APPOINTMENT (OUTPATIENT)
Dept: ANTEPARTUM | Facility: CLINIC | Age: 38
End: 2018-07-18

## 2018-07-18 VITALS
OXYGEN SATURATION: 97 % | WEIGHT: 293 LBS | SYSTOLIC BLOOD PRESSURE: 150 MMHG | DIASTOLIC BLOOD PRESSURE: 109 MMHG | HEART RATE: 80 BPM | HEIGHT: 67 IN | BODY MASS INDEX: 45.99 KG/M2 | TEMPERATURE: 97.5 F

## 2018-07-18 VITALS — SYSTOLIC BLOOD PRESSURE: 173 MMHG | DIASTOLIC BLOOD PRESSURE: 82 MMHG

## 2018-07-18 DIAGNOSIS — L29.8 OTHER PRURITUS: ICD-10-CM

## 2018-07-18 DIAGNOSIS — O09.92 SUPERVISION OF HIGH RISK PREGNANCY, UNSPECIFIED, SECOND TRIMESTER: ICD-10-CM

## 2018-07-18 DIAGNOSIS — O10.919 UNSPECIFIED PRE-EXISTING HYPERTENSION COMPLICATING PREGNANCY, UNSPECIFIED TRIMESTER: ICD-10-CM

## 2018-07-19 DIAGNOSIS — N76.0 ACUTE VAGINITIS: ICD-10-CM

## 2018-07-19 LAB
FETAL HEART DESCRIPTION: NORMAL
FETAL HEART RATE (BPM): 146
SCHEDULED VISIT: YES
URINE ALBUMIN/PROTEIN: 30
URINE GLUCOSE: NORMAL
URINE KETONES: NORMAL
WEEKS GESTATION: NORMAL

## 2018-07-25 ENCOUNTER — OUTPATIENT (OUTPATIENT)
Dept: OUTPATIENT SERVICES | Facility: HOSPITAL | Age: 38
LOS: 1 days | Discharge: HOME | End: 2018-07-25

## 2018-07-25 ENCOUNTER — APPOINTMENT (OUTPATIENT)
Dept: ANTEPARTUM | Facility: CLINIC | Age: 38
End: 2018-07-25

## 2018-07-25 VITALS
DIASTOLIC BLOOD PRESSURE: 83 MMHG | SYSTOLIC BLOOD PRESSURE: 167 MMHG | WEIGHT: 293 LBS | BODY MASS INDEX: 46.55 KG/M2 | HEART RATE: 80 BPM | OXYGEN SATURATION: 100 % | TEMPERATURE: 97.8 F

## 2018-07-25 LAB
ACID FAST STN UR: ABNORMAL
FETAL HEART RATE (BPM): 173
OB COMMENTS: NORMAL
SCHEDULED VISIT: YES
URINE ALBUMIN/PROTEIN: NEGATIVE
URINE GLUCOSE: NORMAL
URINE KETONES: NEGATIVE
WEEKS GESTATION: NORMAL

## 2018-07-26 LAB
BILIRUB UR QL STRIP: NEGATIVE
CLARITY UR: CLEAR
COLLECTION METHOD: NORMAL
GLUCOSE UR-MCNC: NEGATIVE
HCG UR QL: 0.2 EU/DL
HGB UR QL STRIP.AUTO: NEGATIVE
KETONES UR-MCNC: NEGATIVE
LEUKOCYTE ESTERASE UR QL STRIP: NEGATIVE
NITRITE UR QL STRIP: NEGATIVE
PH UR STRIP: 6
PROT UR STRIP-MCNC: NEGATIVE
SP GR UR STRIP: 1

## 2018-07-31 ENCOUNTER — OTHER (OUTPATIENT)
Age: 38
End: 2018-07-31

## 2018-07-31 LAB
A VAGINAE DNA VAG QL NAA+PROBE: ABNORMAL
BVAB2 DNA VAG QL NAA+PROBE: ABNORMAL
C KRUSEI DNA VAG QL NAA+PROBE: NEGATIVE
C KRUSEI DNA VAG QL NAA+PROBE: POSITIVE
C KRUSEI DNA VAG QL NAA+PROBE: POSITIVE
C TRACH RRNA SPEC QL NAA+PROBE: NEGATIVE
MEGA1 DNA VAG QL NAA+PROBE: ABNORMAL
N GONORRHOEA RRNA SPEC QL NAA+PROBE: NEGATIVE
T VAGINALIS RRNA SPEC QL NAA+PROBE: NEGATIVE

## 2018-08-01 DIAGNOSIS — O35.8XX1 MATERNAL CARE FOR OTHER (SUSPECTED) FETAL ABNORMALITY AND DAMAGE, FETUS 1: ICD-10-CM

## 2018-08-08 ENCOUNTER — APPOINTMENT (OUTPATIENT)
Dept: ANTEPARTUM | Facility: CLINIC | Age: 38
End: 2018-08-08

## 2018-08-08 ENCOUNTER — OUTPATIENT (OUTPATIENT)
Dept: OUTPATIENT SERVICES | Facility: HOSPITAL | Age: 38
LOS: 1 days | Discharge: HOME | End: 2018-08-08

## 2018-08-08 VITALS
WEIGHT: 293 LBS | OXYGEN SATURATION: 100 % | SYSTOLIC BLOOD PRESSURE: 145 MMHG | DIASTOLIC BLOOD PRESSURE: 64 MMHG | TEMPERATURE: 98 F | BODY MASS INDEX: 46.67 KG/M2 | HEART RATE: 72 BPM

## 2018-08-08 LAB
BILIRUB UR QL STRIP: NEGATIVE
CLARITY UR: CLEAR
COLLECTION METHOD: NORMAL
FETAL MOVEMENT: PRESENT
GLUCOSE UR-MCNC: NEGATIVE
HCG UR QL: 0.2 EU/DL
HGB UR QL STRIP.AUTO: NEGATIVE
KETONES UR-MCNC: NEGATIVE
LEUKOCYTE ESTERASE UR QL STRIP: NEGATIVE
NITRITE UR QL STRIP: NEGATIVE
PH UR STRIP: 6.5
PROT UR STRIP-MCNC: NORMAL
SCHEDULED VISIT: YES
SP GR UR STRIP: 1.01
URINE ALBUMIN/PROTEIN: NORMAL
URINE GLUCOSE: NEGATIVE
URINE KETONES: NEGATIVE
WEEKS GESTATION: NORMAL

## 2018-08-10 DIAGNOSIS — O09.522 SUPERVISION OF ELDERLY MULTIGRAVIDA, SECOND TRIMESTER: ICD-10-CM

## 2018-08-10 DIAGNOSIS — Z3A.21 21 WEEKS GESTATION OF PREGNANCY: ICD-10-CM

## 2018-08-10 DIAGNOSIS — O98.112: ICD-10-CM

## 2018-08-10 DIAGNOSIS — O35.8XX1 MATERNAL CARE FOR OTHER (SUSPECTED) FETAL ABNORMALITY AND DAMAGE, FETUS 1: ICD-10-CM

## 2018-08-29 ENCOUNTER — APPOINTMENT (OUTPATIENT)
Dept: ANTEPARTUM | Facility: CLINIC | Age: 38
End: 2018-08-29

## 2018-08-29 ENCOUNTER — NON-APPOINTMENT (OUTPATIENT)
Age: 38
End: 2018-08-29

## 2018-08-29 ENCOUNTER — OUTPATIENT (OUTPATIENT)
Dept: OUTPATIENT SERVICES | Facility: HOSPITAL | Age: 38
LOS: 1 days | Discharge: HOME | End: 2018-08-29

## 2018-08-29 VITALS
TEMPERATURE: 98.1 F | BODY MASS INDEX: 47.3 KG/M2 | OXYGEN SATURATION: 99 % | WEIGHT: 293 LBS | HEART RATE: 80 BPM | DIASTOLIC BLOOD PRESSURE: 97 MMHG | SYSTOLIC BLOOD PRESSURE: 198 MMHG

## 2018-08-29 DIAGNOSIS — N76.0 ACUTE VAGINITIS: ICD-10-CM

## 2018-08-29 DIAGNOSIS — B20 HUMAN IMMUNODEFICIENCY VIRUS [HIV] DISEASE: ICD-10-CM

## 2018-08-29 DIAGNOSIS — O21.9 VOMITING OF PREGNANCY, UNSPECIFIED: ICD-10-CM

## 2018-08-29 DIAGNOSIS — Z86.19 PERSONAL HISTORY OF OTHER INFECTIOUS AND PARASITIC DISEASES: ICD-10-CM

## 2018-08-29 DIAGNOSIS — B96.89 ACUTE VAGINITIS: ICD-10-CM

## 2018-08-29 LAB
BILIRUB UR QL STRIP: NEGATIVE
CLARITY UR: CLEAR
COLLECTION METHOD: NORMAL
FETAL MOVEMENT: PRESENT
GLUCOSE UR-MCNC: NEGATIVE
HCG UR QL: 0.2 EU/DL
HGB UR QL STRIP.AUTO: NEGATIVE
KETONES UR-MCNC: NEGATIVE
LEUKOCYTE ESTERASE UR QL STRIP: NEGATIVE
NITRITE UR QL STRIP: NEGATIVE
OB COMMENTS: NORMAL
PH UR STRIP: 6
PROT UR STRIP-MCNC: NORMAL
SCHEDULED VISIT: YES
SP GR UR STRIP: 1.02
URINE ALBUMIN/PROTEIN: NORMAL
URINE GLUCOSE: NEGATIVE
URINE KETONES: NEGATIVE
WEEKS GESTATION: NORMAL

## 2018-08-30 DIAGNOSIS — O09.522 SUPERVISION OF ELDERLY MULTIGRAVIDA, SECOND TRIMESTER: ICD-10-CM

## 2018-08-30 DIAGNOSIS — O10.919 UNSPECIFIED PRE-EXISTING HYPERTENSION COMPLICATING PREGNANCY, UNSPECIFIED TRIMESTER: ICD-10-CM

## 2018-08-30 DIAGNOSIS — O09.92 SUPERVISION OF HIGH RISK PREGNANCY, UNSPECIFIED, SECOND TRIMESTER: ICD-10-CM

## 2018-08-30 DIAGNOSIS — O36.5990 MATERNAL CARE FOR OTHER KNOWN OR SUSPECTED POOR FETAL GROWTH, UNSPECIFIED TRIMESTER, NOT APPLICABLE OR UNSPECIFIED: ICD-10-CM

## 2018-09-05 ENCOUNTER — APPOINTMENT (OUTPATIENT)
Dept: ANTEPARTUM | Facility: CLINIC | Age: 38
End: 2018-09-05

## 2018-09-12 ENCOUNTER — APPOINTMENT (OUTPATIENT)
Dept: ANTEPARTUM | Facility: CLINIC | Age: 38
End: 2018-09-12

## 2018-09-12 ENCOUNTER — NON-APPOINTMENT (OUTPATIENT)
Age: 38
End: 2018-09-12

## 2018-09-12 ENCOUNTER — OUTPATIENT (OUTPATIENT)
Dept: OUTPATIENT SERVICES | Facility: HOSPITAL | Age: 38
LOS: 1 days | Discharge: HOME | End: 2018-09-12

## 2018-09-12 VITALS
SYSTOLIC BLOOD PRESSURE: 140 MMHG | WEIGHT: 293 LBS | HEART RATE: 77 BPM | TEMPERATURE: 97.7 F | OXYGEN SATURATION: 100 % | BODY MASS INDEX: 47.14 KG/M2 | DIASTOLIC BLOOD PRESSURE: 61 MMHG

## 2018-09-12 LAB
ABO + RH PNL BLD: NORMAL
BASOPHILS # BLD AUTO: 0.01 K/UL
BASOPHILS NFR BLD AUTO: 0.2 %
BILIRUB UR QL STRIP: NEGATIVE
BLD GP AB SCN SERPL QL: NORMAL
CLARITY UR: CLEAR
COLLECTION METHOD: NORMAL
EOSINOPHIL # BLD AUTO: 0.03 K/UL
EOSINOPHIL NFR BLD AUTO: 0.5 %
ESTIMATED AVERAGE GLUCOSE: 114 MG/DL
FETAL HEART DESCRIPTION: NORMAL
FETAL HEART RATE (BPM): 144
FETAL MOVEMENT: PRESENT
GLUCOSE UR-MCNC: NEGATIVE
HBA1C MFR BLD HPLC: 5.6 %
HCG UR QL: 0.2 EU/DL
HCT VFR BLD CALC: 32.4 %
HGB BLD-MCNC: 10.7 G/DL
HGB UR QL STRIP.AUTO: NEGATIVE
IMM GRANULOCYTES NFR BLD AUTO: 0.3 %
KETONES UR-MCNC: NORMAL
LEUKOCYTE ESTERASE UR QL STRIP: NEGATIVE
LYMPHOCYTES # BLD AUTO: 2.05 K/UL
LYMPHOCYTES NFR BLD AUTO: 31.9 %
MAN DIFF?: NORMAL
MCHC RBC-ENTMCNC: 28.7 PG
MCHC RBC-ENTMCNC: 33 G/DL
MCV RBC AUTO: 86.9 FL
MONOCYTES # BLD AUTO: 0.5 K/UL
MONOCYTES NFR BLD AUTO: 7.8 %
NEUTROPHILS # BLD AUTO: 3.81 K/UL
NEUTROPHILS NFR BLD AUTO: 59.3 %
NITRITE UR QL STRIP: NEGATIVE
PH UR STRIP: 6
PLATELET # BLD AUTO: 244 K/UL
PROT UR STRIP-MCNC: NORMAL
RBC # BLD: 3.73 M/UL
RBC # FLD: 14.2 %
SCHEDULED VISIT: YES
SP GR UR STRIP: 1.02
URINE ALBUMIN/PROTEIN: NORMAL
URINE GLUCOSE: NEGATIVE
URINE KETONES: NEGATIVE
WBC # FLD AUTO: 6.42 K/UL
WEEKS GESTATION: NORMAL

## 2018-09-13 ENCOUNTER — OUTPATIENT (OUTPATIENT)
Dept: OUTPATIENT SERVICES | Facility: HOSPITAL | Age: 38
LOS: 1 days | Discharge: HOME | End: 2018-09-13

## 2018-09-13 DIAGNOSIS — O98.712 HUMAN IMMUNODEFICIENCY VIRUS [HIV] DISEASE COMPLICATING PREGNANCY, SECOND TRIMESTER: ICD-10-CM

## 2018-09-13 DIAGNOSIS — Z3A.26 26 WEEKS GESTATION OF PREGNANCY: ICD-10-CM

## 2018-09-13 DIAGNOSIS — O10.919 UNSPECIFIED PRE-EXISTING HYPERTENSION COMPLICATING PREGNANCY, UNSPECIFIED TRIMESTER: ICD-10-CM

## 2018-09-13 DIAGNOSIS — O09.522 SUPERVISION OF ELDERLY MULTIGRAVIDA, SECOND TRIMESTER: ICD-10-CM

## 2018-09-13 DIAGNOSIS — O09.92 SUPERVISION OF HIGH RISK PREGNANCY, UNSPECIFIED, SECOND TRIMESTER: ICD-10-CM

## 2018-09-14 DIAGNOSIS — B20 HUMAN IMMUNODEFICIENCY VIRUS [HIV] DISEASE: ICD-10-CM

## 2018-09-17 LAB
AR GENE MUT ANL BLD/T: NEGATIVE
CFTR MUT TESTED BLD/T: NEGATIVE

## 2018-09-26 ENCOUNTER — OUTPATIENT (OUTPATIENT)
Dept: OUTPATIENT SERVICES | Facility: HOSPITAL | Age: 38
LOS: 1 days | Discharge: HOME | End: 2018-09-26

## 2018-09-26 ENCOUNTER — NON-APPOINTMENT (OUTPATIENT)
Age: 38
End: 2018-09-26

## 2018-09-26 ENCOUNTER — APPOINTMENT (OUTPATIENT)
Dept: ANTEPARTUM | Facility: CLINIC | Age: 38
End: 2018-09-26

## 2018-09-26 VITALS
OXYGEN SATURATION: 100 % | HEART RATE: 72 BPM | BODY MASS INDEX: 47.3 KG/M2 | SYSTOLIC BLOOD PRESSURE: 173 MMHG | WEIGHT: 293 LBS | TEMPERATURE: 97.5 F | DIASTOLIC BLOOD PRESSURE: 93 MMHG

## 2018-09-26 VITALS — DIASTOLIC BLOOD PRESSURE: 86 MMHG | SYSTOLIC BLOOD PRESSURE: 151 MMHG

## 2018-09-26 LAB
BILIRUB UR QL STRIP: NEGATIVE
CLARITY UR: CLEAR
COLLECTION METHOD: NORMAL
FETAL MOVEMENT: PRESENT
GLUCOSE UR-MCNC: NEGATIVE
HCG UR QL: 0.2 EU/DL
HGB UR QL STRIP.AUTO: NORMAL
KETONES UR-MCNC: NEGATIVE
LEUKOCYTE ESTERASE UR QL STRIP: NEGATIVE
NITRITE UR QL STRIP: NEGATIVE
OB COMMENTS: NORMAL
PH UR STRIP: 6.5
PROT UR STRIP-MCNC: NORMAL
SCHEDULED VISIT: YES
SP GR UR STRIP: 1.01
URINE ALBUMIN/PROTEIN: NORMAL
URINE GLUCOSE: NEGATIVE
URINE KETONES: NEGATIVE
WEEKS GESTATION: NORMAL

## 2018-09-27 DIAGNOSIS — O98.713 HUMAN IMMUNODEFICIENCY VIRUS [HIV] DISEASE COMPLICATING PREGNANCY, THIRD TRIMESTER: ICD-10-CM

## 2018-09-27 DIAGNOSIS — O09.523 SUPERVISION OF ELDERLY MULTIGRAVIDA, THIRD TRIMESTER: ICD-10-CM

## 2018-09-27 DIAGNOSIS — Z3A.28 28 WEEKS GESTATION OF PREGNANCY: ICD-10-CM

## 2018-09-27 DIAGNOSIS — O10.919 UNSPECIFIED PRE-EXISTING HYPERTENSION COMPLICATING PREGNANCY, UNSPECIFIED TRIMESTER: ICD-10-CM

## 2018-09-27 DIAGNOSIS — O09.93 SUPERVISION OF HIGH RISK PREGNANCY, UNSPECIFIED, THIRD TRIMESTER: ICD-10-CM

## 2018-09-28 ENCOUNTER — RESULT REVIEW (OUTPATIENT)
Age: 38
End: 2018-09-28

## 2018-09-28 LAB — GLUCOSE 1H P 50 G GLC PO SERPL-MCNC: 154 MG/DL

## 2018-10-10 ENCOUNTER — APPOINTMENT (OUTPATIENT)
Dept: ANTEPARTUM | Facility: CLINIC | Age: 38
End: 2018-10-10

## 2018-10-10 ENCOUNTER — OUTPATIENT (OUTPATIENT)
Dept: OUTPATIENT SERVICES | Facility: HOSPITAL | Age: 38
LOS: 1 days | Discharge: HOME | End: 2018-10-10

## 2018-10-10 ENCOUNTER — RESULT CHARGE (OUTPATIENT)
Age: 38
End: 2018-10-10

## 2018-10-10 VITALS
OXYGEN SATURATION: 98 % | BODY MASS INDEX: 47.34 KG/M2 | TEMPERATURE: 98.1 F | SYSTOLIC BLOOD PRESSURE: 137 MMHG | HEART RATE: 77 BPM | DIASTOLIC BLOOD PRESSURE: 73 MMHG | WEIGHT: 293 LBS

## 2018-10-10 LAB
BILIRUB UR QL STRIP: NEGATIVE
CLARITY UR: CLEAR
COLLECTION METHOD: NORMAL
FETAL HEART DESCRIPTION: NORMAL
FETAL HEART RATE (BPM): 155
FETAL MOVEMENT: PRESENT
GLUCOSE UR-MCNC: NEGATIVE
HCG UR QL: 0.2 EU/DL
HGB UR QL STRIP.AUTO: NEGATIVE
KETONES UR-MCNC: NEGATIVE
LEUKOCYTE ESTERASE UR QL STRIP: NEGATIVE
NITRITE UR QL STRIP: NEGATIVE
PH UR STRIP: 6
PROT UR STRIP-MCNC: NORMAL
SCHEDULED VISIT: YES
SP GR UR STRIP: 1.02
URINE ALBUMIN/PROTEIN: NORMAL
URINE GLUCOSE: NEGATIVE
URINE KETONES: NEGATIVE
WEEKS GESTATION: NORMAL

## 2018-10-10 RX ORDER — INDOMETHACIN 25 MG/1
25 CAPSULE ORAL 3 TIMES DAILY
Qty: 9 | Refills: 1 | Status: DISCONTINUED | COMMUNITY
Start: 2018-06-20 | End: 2018-10-10

## 2018-10-12 DIAGNOSIS — Z3A.30 30 WEEKS GESTATION OF PREGNANCY: ICD-10-CM

## 2018-10-12 DIAGNOSIS — O98.713 HUMAN IMMUNODEFICIENCY VIRUS [HIV] DISEASE COMPLICATING PREGNANCY, THIRD TRIMESTER: ICD-10-CM

## 2018-10-12 DIAGNOSIS — O09.523 SUPERVISION OF ELDERLY MULTIGRAVIDA, THIRD TRIMESTER: ICD-10-CM

## 2018-10-12 DIAGNOSIS — O09.93 SUPERVISION OF HIGH RISK PREGNANCY, UNSPECIFIED, THIRD TRIMESTER: ICD-10-CM

## 2018-10-12 DIAGNOSIS — O10.919 UNSPECIFIED PRE-EXISTING HYPERTENSION COMPLICATING PREGNANCY, UNSPECIFIED TRIMESTER: ICD-10-CM

## 2018-10-17 ENCOUNTER — APPOINTMENT (OUTPATIENT)
Dept: ANTEPARTUM | Facility: CLINIC | Age: 38
End: 2018-10-17

## 2018-10-17 ENCOUNTER — OUTPATIENT (OUTPATIENT)
Dept: OUTPATIENT SERVICES | Facility: HOSPITAL | Age: 38
LOS: 1 days | Discharge: HOME | End: 2018-10-17

## 2018-10-17 ENCOUNTER — NON-APPOINTMENT (OUTPATIENT)
Age: 38
End: 2018-10-17

## 2018-10-17 ENCOUNTER — RESULT CHARGE (OUTPATIENT)
Age: 38
End: 2018-10-17

## 2018-10-17 VITALS
WEIGHT: 293 LBS | SYSTOLIC BLOOD PRESSURE: 146 MMHG | TEMPERATURE: 97.6 F | OXYGEN SATURATION: 100 % | HEART RATE: 83 BPM | DIASTOLIC BLOOD PRESSURE: 71 MMHG | BODY MASS INDEX: 47.5 KG/M2

## 2018-10-17 LAB
BILIRUB UR QL STRIP: NEGATIVE
CLARITY UR: CLEAR
COLLECTION METHOD: NORMAL
FETAL MOVEMENT: PRESENT
GLUCOSE UR-MCNC: NEGATIVE
HCG UR QL: 0.2 EU/DL
HGB UR QL STRIP.AUTO: NEGATIVE
KETONES UR-MCNC: NEGATIVE
LEUKOCYTE ESTERASE UR QL STRIP: NEGATIVE
NITRITE UR QL STRIP: NEGATIVE
OB COMMENTS: NORMAL
PH UR STRIP: 6.5
PROT UR STRIP-MCNC: NORMAL
SCHEDULED VISIT: YES
SP GR UR STRIP: 1.01
URINE ALBUMIN/PROTEIN: NORMAL
URINE GLUCOSE: NEGATIVE
URINE KETONES: NEGATIVE
WEEKS GESTATION: NORMAL

## 2018-10-18 DIAGNOSIS — Z3A.31 31 WEEKS GESTATION OF PREGNANCY: ICD-10-CM

## 2018-10-18 DIAGNOSIS — O98.713 HUMAN IMMUNODEFICIENCY VIRUS [HIV] DISEASE COMPLICATING PREGNANCY, THIRD TRIMESTER: ICD-10-CM

## 2018-10-18 DIAGNOSIS — O09.523 SUPERVISION OF ELDERLY MULTIGRAVIDA, THIRD TRIMESTER: ICD-10-CM

## 2018-10-18 DIAGNOSIS — O10.913 UNSPECIFIED PRE-EXISTING HYPERTENSION COMPLICATING PREGNANCY, THIRD TRIMESTER: ICD-10-CM

## 2018-10-18 DIAGNOSIS — O09.93 SUPERVISION OF HIGH RISK PREGNANCY, UNSPECIFIED, THIRD TRIMESTER: ICD-10-CM

## 2018-10-24 ENCOUNTER — OUTPATIENT (OUTPATIENT)
Dept: OUTPATIENT SERVICES | Facility: HOSPITAL | Age: 38
LOS: 1 days | Discharge: HOME | End: 2018-10-24

## 2018-10-24 ENCOUNTER — APPOINTMENT (OUTPATIENT)
Dept: ANTEPARTUM | Facility: CLINIC | Age: 38
End: 2018-10-24

## 2018-10-24 ENCOUNTER — RESULT CHARGE (OUTPATIENT)
Age: 38
End: 2018-10-24

## 2018-10-24 VITALS
DIASTOLIC BLOOD PRESSURE: 77 MMHG | SYSTOLIC BLOOD PRESSURE: 130 MMHG | TEMPERATURE: 97.7 F | HEART RATE: 82 BPM | OXYGEN SATURATION: 98 % | WEIGHT: 293 LBS | BODY MASS INDEX: 47.23 KG/M2

## 2018-10-24 LAB
BILIRUB UR QL STRIP: NEGATIVE
CLARITY UR: CLEAR
COLLECTION METHOD: NORMAL
FETAL HEART DESCRIPTION: NORMAL
FETAL HEART RATE (BPM): 137
FETAL MOVEMENT: PRESENT
GLUCOSE UR-MCNC: NEGATIVE
HCG UR QL: 0.2 EU/DL
HGB UR QL STRIP.AUTO: NEGATIVE
KETONES UR-MCNC: NEGATIVE
LEUKOCYTE ESTERASE UR QL STRIP: NEGATIVE
NITRITE UR QL STRIP: NEGATIVE
OB COMMENTS: NORMAL
PH UR STRIP: 6
PROT UR STRIP-MCNC: NORMAL
SCHEDULED VISIT: YES
SP GR UR STRIP: 1.01
URINE ALBUMIN/PROTEIN: NORMAL
URINE GLUCOSE: NEGATIVE
URINE KETONES: NEGATIVE
WEEKS GESTATION: NORMAL

## 2018-10-26 DIAGNOSIS — Z3A.32 32 WEEKS GESTATION OF PREGNANCY: ICD-10-CM

## 2018-10-26 DIAGNOSIS — O35.8XX1 MATERNAL CARE FOR OTHER (SUSPECTED) FETAL ABNORMALITY AND DAMAGE, FETUS 1: ICD-10-CM

## 2018-10-26 DIAGNOSIS — O09.93 SUPERVISION OF HIGH RISK PREGNANCY, UNSPECIFIED, THIRD TRIMESTER: ICD-10-CM

## 2018-10-26 DIAGNOSIS — O10.913 UNSPECIFIED PRE-EXISTING HYPERTENSION COMPLICATING PREGNANCY, THIRD TRIMESTER: ICD-10-CM

## 2018-10-26 DIAGNOSIS — O98.713 HUMAN IMMUNODEFICIENCY VIRUS [HIV] DISEASE COMPLICATING PREGNANCY, THIRD TRIMESTER: ICD-10-CM

## 2018-10-26 DIAGNOSIS — O09.523 SUPERVISION OF ELDERLY MULTIGRAVIDA, THIRD TRIMESTER: ICD-10-CM

## 2018-10-31 ENCOUNTER — APPOINTMENT (OUTPATIENT)
Dept: ANTEPARTUM | Facility: CLINIC | Age: 38
End: 2018-10-31

## 2018-11-02 LAB
GLUCOSE 1H P 100 G GLC PO SERPL-MCNC: 214 MG/DL
GLUCOSE 2H P 100 G GLC PO SERPL-MCNC: 225 MG/DL
GLUCOSE 3H P CHAL SERPL-MCNC: 129 MG/DL
GLUCOSE BS SERPL-MCNC: 98 MG/DL

## 2018-11-07 ENCOUNTER — OUTPATIENT (OUTPATIENT)
Dept: OUTPATIENT SERVICES | Facility: HOSPITAL | Age: 38
LOS: 1 days | Discharge: HOME | End: 2018-11-07

## 2018-11-07 ENCOUNTER — APPOINTMENT (OUTPATIENT)
Dept: ANTEPARTUM | Facility: CLINIC | Age: 38
End: 2018-11-07

## 2018-11-07 VITALS
TEMPERATURE: 97.6 F | OXYGEN SATURATION: 100 % | HEART RATE: 67 BPM | DIASTOLIC BLOOD PRESSURE: 68 MMHG | SYSTOLIC BLOOD PRESSURE: 125 MMHG | BODY MASS INDEX: 47.3 KG/M2 | WEIGHT: 293 LBS

## 2018-11-07 DIAGNOSIS — O98.713 HUMAN IMMUNODEFICIENCY VIRUS [HIV] DISEASE COMPLICATING PREGNANCY, THIRD TRIMESTER: ICD-10-CM

## 2018-11-07 DIAGNOSIS — O35.8XX1 MATERNAL CARE FOR OTHER (SUSPECTED) FETAL ABNORMALITY AND DAMAGE, FETUS 1: ICD-10-CM

## 2018-11-07 DIAGNOSIS — Z86.19 PERSONAL HISTORY OF OTHER INFECTIOUS AND PARASITIC DISEASES: ICD-10-CM

## 2018-11-07 DIAGNOSIS — Z3A.35 35 WEEKS GESTATION OF PREGNANCY: ICD-10-CM

## 2018-11-07 DIAGNOSIS — O09.93 SUPERVISION OF HIGH RISK PREGNANCY, UNSPECIFIED, THIRD TRIMESTER: ICD-10-CM

## 2018-11-07 DIAGNOSIS — O24.419 GESTATIONAL DIABETES MELLITUS IN PREGNANCY, UNSPECIFIED CONTROL: ICD-10-CM

## 2018-11-07 DIAGNOSIS — O09.523 SUPERVISION OF ELDERLY MULTIGRAVIDA, THIRD TRIMESTER: ICD-10-CM

## 2018-11-07 DIAGNOSIS — O10.913 UNSPECIFIED PRE-EXISTING HYPERTENSION COMPLICATING PREGNANCY, THIRD TRIMESTER: ICD-10-CM

## 2018-11-07 LAB
BILIRUB UR QL STRIP: NEGATIVE
CLARITY UR: CLEAR
COLLECTION METHOD: NORMAL
FETAL HEART DESCRIPTION: NORMAL
FETAL HEART RATE (BPM): 125
FETAL MOVEMENT: PRESENT
FETAL PRESENTATION: NORMAL
GLUCOSE UR-MCNC: NEGATIVE
HCG UR QL: 0.2 EU/DL
HGB UR QL STRIP.AUTO: NEGATIVE
KETONES UR-MCNC: NEGATIVE
LEUKOCYTE ESTERASE UR QL STRIP: NEGATIVE
NITRITE UR QL STRIP: NEGATIVE
OB COMMENTS: NORMAL
PH UR STRIP: 6
PROT UR STRIP-MCNC: NORMAL
SCHEDULED VISIT: YES
SP GR UR STRIP: 1.02
URINE ALBUMIN/PROTEIN: NORMAL
URINE GLUCOSE: NEGATIVE
URINE KETONES: NORMAL
WEEKS GESTATION: NORMAL

## 2018-11-14 ENCOUNTER — APPOINTMENT (OUTPATIENT)
Dept: ANTEPARTUM | Facility: CLINIC | Age: 38
End: 2018-11-14

## 2018-11-14 ENCOUNTER — RESULT CHARGE (OUTPATIENT)
Age: 38
End: 2018-11-14

## 2018-11-14 ENCOUNTER — OUTPATIENT (OUTPATIENT)
Dept: OUTPATIENT SERVICES | Facility: HOSPITAL | Age: 38
LOS: 1 days | Discharge: HOME | End: 2018-11-14

## 2018-11-14 ENCOUNTER — NON-APPOINTMENT (OUTPATIENT)
Age: 38
End: 2018-11-14

## 2018-11-14 VITALS
OXYGEN SATURATION: 97 % | SYSTOLIC BLOOD PRESSURE: 111 MMHG | HEART RATE: 73 BPM | TEMPERATURE: 97.9 F | DIASTOLIC BLOOD PRESSURE: 57 MMHG | BODY MASS INDEX: 47.04 KG/M2 | WEIGHT: 293 LBS

## 2018-11-14 DIAGNOSIS — Z86.39 PERSONAL HISTORY OF OTHER ENDOCRINE, NUTRITIONAL AND METABOLIC DISEASE: ICD-10-CM

## 2018-11-14 DIAGNOSIS — Z34.90 ENCOUNTER FOR SUPERVISION OF NORMAL PREGNANCY, UNSPECIFIED, UNSPECIFIED TRIMESTER: ICD-10-CM

## 2018-11-14 LAB
BILIRUB UR QL STRIP: NEGATIVE
CLARITY UR: CLEAR
COLLECTION METHOD: NORMAL
FETAL HEART DESCRIPTION: NORMAL
FETAL HEART RATE (BPM): 145
FETAL MOVEMENT: PRESENT
FETAL PRESENTATION: NORMAL
GLUCOSE BLDC GLUCOMTR-MCNC: 72
GLUCOSE BLDC GLUCOMTR-MCNC: 72 MG/DL — SIGNIFICANT CHANGE UP (ref 70–99)
GLUCOSE UR-MCNC: NEGATIVE
HCG UR QL: 0.2 EU/DL
HGB UR QL STRIP.AUTO: NEGATIVE
KETONES UR-MCNC: NEGATIVE
LEUKOCYTE ESTERASE UR QL STRIP: NEGATIVE
NITRITE UR QL STRIP: NEGATIVE
OB COMMENTS: NORMAL
PH UR STRIP: 6.5
PROT UR STRIP-MCNC: NORMAL
SCHEDULED VISIT: YES
SP GR UR STRIP: 1.02
URINE ALBUMIN/PROTEIN: NORMAL
URINE GLUCOSE: NEGATIVE
URINE KETONES: NEGATIVE
WEEKS GESTATION: NORMAL

## 2018-11-14 RX ORDER — PYRIDOXINE HCL (VITAMIN B6) 25 MG
25 TABLET ORAL
Qty: 60 | Refills: 1 | Status: DISCONTINUED | COMMUNITY
Start: 2018-05-24 | End: 2018-11-14

## 2018-11-14 RX ORDER — METRONIDAZOLE 500 MG/1
500 TABLET ORAL TWICE DAILY
Qty: 14 | Refills: 0 | Status: DISCONTINUED | COMMUNITY
Start: 2018-06-01 | End: 2018-11-14

## 2018-11-14 RX ORDER — METRONIDAZOLE 7.5 MG/G
0.75 GEL VAGINAL
Qty: 1 | Refills: 0 | Status: DISCONTINUED | COMMUNITY
Start: 2018-06-11 | End: 2018-11-14

## 2018-11-14 RX ORDER — DOXYLAMINE SUCCINATE 25 MG
25 TABLET ORAL
Qty: 30 | Refills: 1 | Status: DISCONTINUED | COMMUNITY
Start: 2018-05-24 | End: 2018-11-14

## 2018-11-14 RX ORDER — METRONIDAZOLE 500 MG/1
500 TABLET ORAL TWICE DAILY
Qty: 14 | Refills: 0 | Status: DISCONTINUED | COMMUNITY
Start: 2018-07-31 | End: 2018-11-14

## 2018-11-14 RX ORDER — TERCONAZOLE 4 MG/G
0.4 CREAM VAGINAL
Qty: 1 | Refills: 0 | Status: DISCONTINUED | COMMUNITY
Start: 2018-07-31 | End: 2018-11-14

## 2018-11-19 DIAGNOSIS — O98.713 HUMAN IMMUNODEFICIENCY VIRUS [HIV] DISEASE COMPLICATING PREGNANCY, THIRD TRIMESTER: ICD-10-CM

## 2018-11-19 DIAGNOSIS — Z3A.35 35 WEEKS GESTATION OF PREGNANCY: ICD-10-CM

## 2018-11-19 DIAGNOSIS — O09.523 SUPERVISION OF ELDERLY MULTIGRAVIDA, THIRD TRIMESTER: ICD-10-CM

## 2018-11-19 DIAGNOSIS — O10.913 UNSPECIFIED PRE-EXISTING HYPERTENSION COMPLICATING PREGNANCY, THIRD TRIMESTER: ICD-10-CM

## 2018-11-19 DIAGNOSIS — O24.410 GESTATIONAL DIABETES MELLITUS IN PREGNANCY, DIET CONTROLLED: ICD-10-CM

## 2018-11-19 DIAGNOSIS — O09.93 SUPERVISION OF HIGH RISK PREGNANCY, UNSPECIFIED, THIRD TRIMESTER: ICD-10-CM

## 2018-11-21 ENCOUNTER — NON-APPOINTMENT (OUTPATIENT)
Age: 38
End: 2018-11-21

## 2018-11-21 ENCOUNTER — APPOINTMENT (OUTPATIENT)
Dept: ANTEPARTUM | Facility: CLINIC | Age: 38
End: 2018-11-21

## 2018-11-21 ENCOUNTER — OUTPATIENT (OUTPATIENT)
Dept: OUTPATIENT SERVICES | Facility: HOSPITAL | Age: 38
LOS: 1 days | Discharge: HOME | End: 2018-11-21

## 2018-11-21 VITALS
SYSTOLIC BLOOD PRESSURE: 130 MMHG | OXYGEN SATURATION: 97 % | WEIGHT: 293 LBS | TEMPERATURE: 98 F | BODY MASS INDEX: 47.46 KG/M2 | HEART RATE: 73 BPM | DIASTOLIC BLOOD PRESSURE: 60 MMHG

## 2018-11-21 LAB
ALBUMIN SERPL ELPH-MCNC: 3.4 G/DL
ALP BLD-CCNC: 126 U/L
ALT SERPL-CCNC: 16 U/L
ANION GAP SERPL CALC-SCNC: 12 MMOL/L
AST SERPL-CCNC: 22 U/L
BASOPHILS # BLD AUTO: 0.01 K/UL
BASOPHILS NFR BLD AUTO: 0.2 %
BILIRUB SERPL-MCNC: 0.4 MG/DL
BILIRUB UR QL STRIP: NEGATIVE
BUN SERPL-MCNC: 7 MG/DL
CALCIUM SERPL-MCNC: 8.8 MG/DL
CHLORIDE SERPL-SCNC: 104 MMOL/L
CLARITY UR: CLEAR
CO2 SERPL-SCNC: 21 MMOL/L
COLLECTION METHOD: NORMAL
CREAT SERPL-MCNC: 0.9 MG/DL
EOSINOPHIL # BLD AUTO: 0.04 K/UL
EOSINOPHIL NFR BLD AUTO: 0.6 %
FETAL MOVEMENT: PRESENT
GLUCOSE BLDC GLUCOMTR-MCNC: 116
GLUCOSE BLDC GLUCOMTR-MCNC: 116 MG/DL — HIGH (ref 70–99)
GLUCOSE SERPL-MCNC: 98 MG/DL
GLUCOSE UR-MCNC: NEGATIVE
GP B STREP DNA SPEC QL NAA+PROBE: NORMAL
GP B STREP DNA SPEC QL NAA+PROBE: NOT DETECTED
HCG UR QL: 0.2 EU/DL
HCT VFR BLD CALC: 32.2 %
HGB BLD-MCNC: 10.4 G/DL
HGB UR QL STRIP.AUTO: NEGATIVE
IMM GRANULOCYTES NFR BLD AUTO: 0.6 %
KETONES UR-MCNC: NEGATIVE
LDH SERPL-CCNC: 194
LEUKOCYTE ESTERASE UR QL STRIP: NEGATIVE
LYMPHOCYTES # BLD AUTO: 2.08 K/UL
LYMPHOCYTES NFR BLD AUTO: 32.1 %
MAN DIFF?: NORMAL
MCHC RBC-ENTMCNC: 27.2 PG
MCHC RBC-ENTMCNC: 32.3 G/DL
MCV RBC AUTO: 84.3 FL
MONOCYTES # BLD AUTO: 0.47 K/UL
MONOCYTES NFR BLD AUTO: 7.3 %
NEUTROPHILS # BLD AUTO: 3.84 K/UL
NEUTROPHILS NFR BLD AUTO: 59.2 %
NITRITE UR QL STRIP: NEGATIVE
OB COMMENTS: NORMAL
PH UR STRIP: 6
PLATELET # BLD AUTO: 215 K/UL
POTASSIUM SERPL-SCNC: 4.5 MMOL/L
PROT SERPL-MCNC: 7.3 G/DL
PROT UR STRIP-MCNC: NORMAL
RBC # BLD: 3.82 M/UL
RBC # FLD: 15.1 %
SCHEDULED VISIT: YES
SODIUM SERPL-SCNC: 137 MMOL/L
SOURCE GBS: NORMAL
SP GR UR STRIP: 1.03
URATE SERPL-MCNC: 4.8 MG/DL
URINE ALBUMIN/PROTEIN: NORMAL
URINE GLUCOSE: NEGATIVE
URINE KETONES: NORMAL
WBC # FLD AUTO: 6.48 K/UL
WEEKS GESTATION: NORMAL

## 2018-11-23 DIAGNOSIS — O09.523 SUPERVISION OF ELDERLY MULTIGRAVIDA, THIRD TRIMESTER: ICD-10-CM

## 2018-11-23 DIAGNOSIS — O24.410 GESTATIONAL DIABETES MELLITUS IN PREGNANCY, DIET CONTROLLED: ICD-10-CM

## 2018-11-23 DIAGNOSIS — O98.713 HUMAN IMMUNODEFICIENCY VIRUS [HIV] DISEASE COMPLICATING PREGNANCY, THIRD TRIMESTER: ICD-10-CM

## 2018-11-23 DIAGNOSIS — O09.93 SUPERVISION OF HIGH RISK PREGNANCY, UNSPECIFIED, THIRD TRIMESTER: ICD-10-CM

## 2018-11-23 DIAGNOSIS — O10.913 UNSPECIFIED PRE-EXISTING HYPERTENSION COMPLICATING PREGNANCY, THIRD TRIMESTER: ICD-10-CM

## 2018-11-23 DIAGNOSIS — Z3A.35 35 WEEKS GESTATION OF PREGNANCY: ICD-10-CM

## 2018-11-23 DIAGNOSIS — O36.63X1 MATERNAL CARE FOR EXCESSIVE FETAL GROWTH, THIRD TRIMESTER, FETUS 1: ICD-10-CM

## 2018-11-27 LAB
A VAGINAE DNA VAG QL NAA+PROBE: NORMAL
BVAB2 DNA VAG QL NAA+PROBE: NORMAL
C KRUSEI DNA VAG QL NAA+PROBE: NEGATIVE
C TRACH RRNA SPEC QL NAA+PROBE: NEGATIVE
MEGA1 DNA VAG QL NAA+PROBE: ABNORMAL
N GONORRHOEA RRNA SPEC QL NAA+PROBE: NEGATIVE
T VAGINALIS RRNA SPEC QL NAA+PROBE: NEGATIVE

## 2018-11-28 ENCOUNTER — APPOINTMENT (OUTPATIENT)
Dept: ANTEPARTUM | Facility: CLINIC | Age: 38
End: 2018-11-28

## 2018-11-28 ENCOUNTER — RESULT CHARGE (OUTPATIENT)
Age: 38
End: 2018-11-28

## 2018-11-28 ENCOUNTER — NON-APPOINTMENT (OUTPATIENT)
Age: 38
End: 2018-11-28

## 2018-11-28 ENCOUNTER — OUTPATIENT (OUTPATIENT)
Dept: OUTPATIENT SERVICES | Facility: HOSPITAL | Age: 38
LOS: 1 days | Discharge: HOME | End: 2018-11-28

## 2018-11-28 VITALS
BODY MASS INDEX: 47.64 KG/M2 | WEIGHT: 293 LBS | HEART RATE: 64 BPM | DIASTOLIC BLOOD PRESSURE: 74 MMHG | TEMPERATURE: 97.6 F | SYSTOLIC BLOOD PRESSURE: 155 MMHG | OXYGEN SATURATION: 100 %

## 2018-11-28 VITALS — HEART RATE: 73 BPM | DIASTOLIC BLOOD PRESSURE: 62 MMHG | SYSTOLIC BLOOD PRESSURE: 123 MMHG

## 2018-11-28 DIAGNOSIS — R73.09 OTHER ABNORMAL GLUCOSE: ICD-10-CM

## 2018-11-28 DIAGNOSIS — D25.9 MATERNAL CARE FOR BENIGN TUMOR OF CORPUS UTERI, SECOND TRIMESTER: ICD-10-CM

## 2018-11-28 DIAGNOSIS — O34.12 MATERNAL CARE FOR BENIGN TUMOR OF CORPUS UTERI, SECOND TRIMESTER: ICD-10-CM

## 2018-11-28 DIAGNOSIS — O28.1 ABNORMAL BIOCHEMICAL FINDING ON ANTENATAL SCREENING OF MOTHER: ICD-10-CM

## 2018-11-28 DIAGNOSIS — O09.93 SUPERVISION OF HIGH RISK PREGNANCY, UNSPECIFIED, THIRD TRIMESTER: ICD-10-CM

## 2018-11-28 DIAGNOSIS — O10.919 UNSPECIFIED PRE-EXISTING HYPERTENSION COMPLICATING PREGNANCY, UNSPECIFIED TRIMESTER: ICD-10-CM

## 2018-11-28 DIAGNOSIS — O99.013 ANEMIA COMPLICATING PREGNANCY, THIRD TRIMESTER: ICD-10-CM

## 2018-11-28 DIAGNOSIS — Z21 HUMAN IMMUNODEFICIENCY VIRUS [HIV] DISEASE COMPLICATING PREGNANCY, UNSPECIFIED TRIMESTER: ICD-10-CM

## 2018-11-28 DIAGNOSIS — O31.21X0 CONTINUING PREGNANCY AFTER INTRAUTERINE DEATH OF ONE FETUS OR MORE, FIRST TRIMESTER, NOT APPLICABLE OR UNSPECIFIED: ICD-10-CM

## 2018-11-28 DIAGNOSIS — O98.719 HUMAN IMMUNODEFICIENCY VIRUS [HIV] DISEASE COMPLICATING PREGNANCY, UNSPECIFIED TRIMESTER: ICD-10-CM

## 2018-11-28 LAB
BILIRUB UR QL STRIP: NEGATIVE
CLARITY UR: CLEAR
FETAL HEART DESCRIPTION: NORMAL
FETAL HEART RATE (BPM): 151
FETAL MOVEMENT: PRESENT
FETAL PRESENTATION: NORMAL
GLUCOSE BLDC GLUCOMTR-MCNC: 91
GLUCOSE BLDC GLUCOMTR-MCNC: 91 MG/DL — SIGNIFICANT CHANGE UP (ref 70–99)
GLUCOSE UR-MCNC: NEGATIVE
HCG UR QL: 0.2 EU/DL
HGB UR QL STRIP.AUTO: NEGATIVE
KETONES UR-MCNC: NEGATIVE
LEUKOCYTE ESTERASE UR QL STRIP: NEGATIVE
NITRITE UR QL STRIP: NEGATIVE
OB COMMENTS: NORMAL
PH UR STRIP: 6.5
PROT UR STRIP-MCNC: 30
SCHEDULED VISIT: YES
SP GR UR STRIP: 1.01
URINE ALBUMIN/PROTEIN: 30
URINE GLUCOSE: NEGATIVE
URINE KETONES: NEGATIVE
WEEKS GESTATION: NORMAL

## 2018-12-01 ENCOUNTER — OUTPATIENT (OUTPATIENT)
Dept: OUTPATIENT SERVICES | Facility: HOSPITAL | Age: 38
LOS: 1 days | End: 2018-12-01
Payer: MEDICAID

## 2018-12-01 PROCEDURE — G9001: CPT

## 2018-12-03 ENCOUNTER — OTHER (OUTPATIENT)
Age: 38
End: 2018-12-03

## 2018-12-03 DIAGNOSIS — O09.93 SUPERVISION OF HIGH RISK PREGNANCY, UNSPECIFIED, THIRD TRIMESTER: ICD-10-CM

## 2018-12-03 DIAGNOSIS — O98.713 HUMAN IMMUNODEFICIENCY VIRUS [HIV] DISEASE COMPLICATING PREGNANCY, THIRD TRIMESTER: ICD-10-CM

## 2018-12-03 DIAGNOSIS — Z3A.37 37 WEEKS GESTATION OF PREGNANCY: ICD-10-CM

## 2018-12-03 DIAGNOSIS — O10.913 UNSPECIFIED PRE-EXISTING HYPERTENSION COMPLICATING PREGNANCY, THIRD TRIMESTER: ICD-10-CM

## 2018-12-03 DIAGNOSIS — O35.8XX0 MATERNAL CARE FOR OTHER (SUSPECTED) FETAL ABNORMALITY AND DAMAGE, NOT APPLICABLE OR UNSPECIFIED: ICD-10-CM

## 2018-12-03 DIAGNOSIS — O24.410 GESTATIONAL DIABETES MELLITUS IN PREGNANCY, DIET CONTROLLED: ICD-10-CM

## 2018-12-04 ENCOUNTER — OUTPATIENT (OUTPATIENT)
Dept: OUTPATIENT SERVICES | Facility: HOSPITAL | Age: 38
LOS: 1 days | Discharge: HOME | End: 2018-12-04

## 2018-12-04 ENCOUNTER — APPOINTMENT (OUTPATIENT)
Dept: CARDIOLOGY | Facility: CLINIC | Age: 38
End: 2018-12-04

## 2018-12-04 DIAGNOSIS — R01.1 CARDIAC MURMUR, UNSPECIFIED: ICD-10-CM

## 2018-12-04 NOTE — PHYSICAL EXAM
[General Appearance - Well Developed] : well developed [General Appearance - In No Acute Distress] : no acute distress [Normal Conjunctiva] : the conjunctiva exhibited no abnormalities [Eyelids - No Xanthelasma] : the eyelids demonstrated no xanthelasmas [Heart Rate And Rhythm] : heart rate and rhythm were normal [Heart Sounds] : normal S1 and S2 [Murmurs] : no murmurs present [Respiration, Rhythm And Depth] : normal respiratory rhythm and effort [Exaggerated Use Of Accessory Muscles For Inspiration] : no accessory muscle use [Auscultation Breath Sounds / Voice Sounds] : lungs were clear to auscultation bilaterally [Abdomen Soft] : soft [Abdomen Tenderness] : non-tender [] : no hepato-splenomegaly [Abdomen Mass (___ Cm)] : no abdominal mass palpated [Nail Clubbing] : no clubbing of the fingernails [Cyanosis, Localized] : no localized cyanosis [Skin Color & Pigmentation] : normal skin color and pigmentation [No Skin Ulcers] : no skin ulcer [Oriented To Time, Place, And Person] : oriented to person, place, and time [FreeTextEntry1] : no JVD

## 2018-12-05 ENCOUNTER — NON-APPOINTMENT (OUTPATIENT)
Age: 38
End: 2018-12-05

## 2018-12-05 ENCOUNTER — APPOINTMENT (OUTPATIENT)
Dept: ANTEPARTUM | Facility: CLINIC | Age: 38
End: 2018-12-05

## 2018-12-05 ENCOUNTER — OUTPATIENT (OUTPATIENT)
Dept: OUTPATIENT SERVICES | Facility: HOSPITAL | Age: 38
LOS: 1 days | Discharge: HOME | End: 2018-12-05

## 2018-12-05 VITALS
HEART RATE: 80 BPM | DIASTOLIC BLOOD PRESSURE: 63 MMHG | WEIGHT: 293 LBS | BODY MASS INDEX: 47.46 KG/M2 | OXYGEN SATURATION: 100 % | TEMPERATURE: 97.9 F | SYSTOLIC BLOOD PRESSURE: 139 MMHG

## 2018-12-05 LAB
BILIRUB UR QL STRIP: NEGATIVE
CLARITY UR: CLEAR
COLLECTION METHOD: NORMAL
FETAL HEART DESCRIPTION: NORMAL
FETAL HEART RATE (BPM): 145
FETAL MOVEMENT: PRESENT
FETAL PRESENTATION: NORMAL
GLUCOSE BLDC GLUCOMTR-MCNC: 163
GLUCOSE BLDC GLUCOMTR-MCNC: 163 MG/DL — HIGH (ref 70–99)
GLUCOSE UR-MCNC: NORMAL
HCG UR QL: NORMAL EU/DL
HGB UR QL STRIP.AUTO: NORMAL
HIV1 RNA # SERPL NAA+PROBE: NORMAL
HIV1 RNA # SERPL NAA+PROBE: NOT DETECTED COPIES/ML
KETONES UR-MCNC: NEGATIVE
LEUKOCYTE ESTERASE UR QL STRIP: NEGATIVE
NITRITE UR QL STRIP: NEGATIVE
OB COMMENTS: NORMAL
PH UR STRIP: 6
PROT UR STRIP-MCNC: NORMAL
SCHEDULED VISIT: YES
SP GR UR STRIP: 1.03
URINE ALBUMIN/PROTEIN: NORMAL
URINE GLUCOSE: NEGATIVE
URINE KETONES: NEGATIVE
VIRAL LOAD LOG: NOT DETECTED LG COP/ML
WEEKS GESTATION: NORMAL

## 2018-12-06 ENCOUNTER — RESULT REVIEW (OUTPATIENT)
Age: 38
End: 2018-12-06

## 2018-12-06 ENCOUNTER — INPATIENT (INPATIENT)
Facility: HOSPITAL | Age: 38
LOS: 5 days | Discharge: HOME HEALTH | End: 2018-12-12
Attending: OBSTETRICS & GYNECOLOGY | Admitting: OBSTETRICS & GYNECOLOGY

## 2018-12-06 VITALS — SYSTOLIC BLOOD PRESSURE: 162 MMHG | DIASTOLIC BLOOD PRESSURE: 73 MMHG | HEART RATE: 82 BPM

## 2018-12-06 LAB
ALBUMIN SERPL ELPH-MCNC: 2.9 G/DL — LOW (ref 3.5–5.2)
ALP SERPL-CCNC: 151 U/L — HIGH (ref 30–115)
ALT FLD-CCNC: 18 U/L — SIGNIFICANT CHANGE UP (ref 0–41)
AMPHET UR-MCNC: NEGATIVE — SIGNIFICANT CHANGE UP
ANION GAP SERPL CALC-SCNC: 16 MMOL/L — HIGH (ref 7–14)
APPEARANCE UR: ABNORMAL
AST SERPL-CCNC: 24 U/L — SIGNIFICANT CHANGE UP (ref 0–41)
BACTERIA # UR AUTO: ABNORMAL /HPF
BARBITURATES UR SCN-MCNC: NEGATIVE — SIGNIFICANT CHANGE UP
BASOPHILS # BLD AUTO: 0.01 K/UL — SIGNIFICANT CHANGE UP (ref 0–0.2)
BASOPHILS NFR BLD AUTO: 0.1 % — SIGNIFICANT CHANGE UP (ref 0–1)
BENZODIAZ UR-MCNC: NEGATIVE — SIGNIFICANT CHANGE UP
BILIRUB SERPL-MCNC: 0.4 MG/DL — SIGNIFICANT CHANGE UP (ref 0.2–1.2)
BILIRUB UR-MCNC: NEGATIVE — SIGNIFICANT CHANGE UP
BLD GP AB SCN SERPL QL: SIGNIFICANT CHANGE UP
BUN SERPL-MCNC: 8 MG/DL — LOW (ref 10–20)
CALCIUM SERPL-MCNC: 9.1 MG/DL — SIGNIFICANT CHANGE UP (ref 8.5–10.1)
CHLORIDE SERPL-SCNC: 101 MMOL/L — SIGNIFICANT CHANGE UP (ref 98–110)
CO2 SERPL-SCNC: 18 MMOL/L — SIGNIFICANT CHANGE UP (ref 17–32)
COCAINE METAB.OTHER UR-MCNC: NEGATIVE — SIGNIFICANT CHANGE UP
COLOR SPEC: SIGNIFICANT CHANGE UP
CREAT ?TM UR-MCNC: 246 MG/DL — SIGNIFICANT CHANGE UP
CREAT SERPL-MCNC: 0.9 MG/DL — SIGNIFICANT CHANGE UP (ref 0.7–1.5)
DIFF PNL FLD: NEGATIVE — SIGNIFICANT CHANGE UP
EOSINOPHIL # BLD AUTO: 0.03 K/UL — SIGNIFICANT CHANGE UP (ref 0–0.7)
EOSINOPHIL NFR BLD AUTO: 0.4 % — SIGNIFICANT CHANGE UP (ref 0–8)
GLUCOSE BLDC GLUCOMTR-MCNC: 118 MG/DL — HIGH (ref 70–99)
GLUCOSE BLDC GLUCOMTR-MCNC: 118 MG/DL — HIGH (ref 70–99)
GLUCOSE BLDC GLUCOMTR-MCNC: 157 MG/DL — HIGH (ref 70–99)
GLUCOSE BLDC GLUCOMTR-MCNC: 88 MG/DL — SIGNIFICANT CHANGE UP (ref 70–99)
GLUCOSE BLDC GLUCOMTR-MCNC: 95 MG/DL — SIGNIFICANT CHANGE UP (ref 70–99)
GLUCOSE SERPL-MCNC: 160 MG/DL — HIGH (ref 70–99)
GLUCOSE UR QL: NEGATIVE MG/DL — SIGNIFICANT CHANGE UP
HCT VFR BLD CALC: 31.7 % — LOW (ref 37–47)
HGB BLD-MCNC: 10.5 G/DL — LOW (ref 12–16)
IMM GRANULOCYTES NFR BLD AUTO: 0.6 % — HIGH (ref 0.1–0.3)
KETONES UR-MCNC: NEGATIVE — SIGNIFICANT CHANGE UP
LDH SERPL L TO P-CCNC: 207 — SIGNIFICANT CHANGE UP (ref 50–242)
LEUKOCYTE ESTERASE UR-ACNC: NEGATIVE — SIGNIFICANT CHANGE UP
LYMPHOCYTES # BLD AUTO: 2.38 K/UL — SIGNIFICANT CHANGE UP (ref 1.2–3.4)
LYMPHOCYTES # BLD AUTO: 33.4 % — SIGNIFICANT CHANGE UP (ref 20.5–51.1)
MCHC RBC-ENTMCNC: 26.9 PG — LOW (ref 27–31)
MCHC RBC-ENTMCNC: 33.1 G/DL — SIGNIFICANT CHANGE UP (ref 32–37)
MCV RBC AUTO: 81.1 FL — SIGNIFICANT CHANGE UP (ref 81–99)
METHADONE UR-MCNC: NEGATIVE — SIGNIFICANT CHANGE UP
MONOCYTES # BLD AUTO: 0.41 K/UL — SIGNIFICANT CHANGE UP (ref 0.1–0.6)
MONOCYTES NFR BLD AUTO: 5.8 % — SIGNIFICANT CHANGE UP (ref 1.7–9.3)
NEUTROPHILS # BLD AUTO: 4.26 K/UL — SIGNIFICANT CHANGE UP (ref 1.4–6.5)
NEUTROPHILS NFR BLD AUTO: 59.7 % — SIGNIFICANT CHANGE UP (ref 42.2–75.2)
NITRITE UR-MCNC: NEGATIVE — SIGNIFICANT CHANGE UP
NRBC # BLD: 0 /100 WBCS — SIGNIFICANT CHANGE UP (ref 0–0)
OPIATES UR-MCNC: NEGATIVE — SIGNIFICANT CHANGE UP
PCP SPEC-MCNC: SIGNIFICANT CHANGE UP
PH UR: 6 — SIGNIFICANT CHANGE UP (ref 5–8)
PLATELET # BLD AUTO: 209 K/UL — SIGNIFICANT CHANGE UP (ref 130–400)
POTASSIUM SERPL-MCNC: 3.8 MMOL/L — SIGNIFICANT CHANGE UP (ref 3.5–5)
POTASSIUM SERPL-SCNC: 3.8 MMOL/L — SIGNIFICANT CHANGE UP (ref 3.5–5)
PRENATAL SYPHILIS TEST: SIGNIFICANT CHANGE UP
PROPOXYPHENE QUALITATIVE URINE RESULT: NEGATIVE — SIGNIFICANT CHANGE UP
PROT ?TM UR-MCNC: 47 MG/DLG/24H — SIGNIFICANT CHANGE UP
PROT SERPL-MCNC: 7.2 G/DL — SIGNIFICANT CHANGE UP (ref 6–8)
PROT UR-MCNC: 30 MG/DL
PROT/CREAT UR-RTO: 0.2 RATIO — SIGNIFICANT CHANGE UP (ref 0–0.2)
RBC # BLD: 3.91 M/UL — LOW (ref 4.2–5.4)
RBC # FLD: 14.9 % — HIGH (ref 11.5–14.5)
SODIUM SERPL-SCNC: 135 MMOL/L — SIGNIFICANT CHANGE UP (ref 135–146)
SP GR SPEC: 1.02 — SIGNIFICANT CHANGE UP (ref 1.01–1.03)
TYPE + AB SCN PNL BLD: SIGNIFICANT CHANGE UP
URATE SERPL-MCNC: 5.5 MG/DL — SIGNIFICANT CHANGE UP (ref 2.5–7)
UROBILINOGEN FLD QL: 0.2 MG/DL — SIGNIFICANT CHANGE UP (ref 0.2–0.2)
WBC # BLD: 7.13 K/UL — SIGNIFICANT CHANGE UP (ref 4.8–10.8)
WBC # FLD AUTO: 7.13 K/UL — SIGNIFICANT CHANGE UP (ref 4.8–10.8)

## 2018-12-06 RX ORDER — FENTANYL/BUPIVACAINE/NS/PF 2MCG/ML-.1
250 PLASTIC BAG, INJECTION (ML) INJECTION
Qty: 0 | Refills: 0 | Status: DISCONTINUED | OUTPATIENT
Start: 2018-12-06 | End: 2018-12-12

## 2018-12-06 RX ORDER — AMLODIPINE BESYLATE 2.5 MG/1
1 TABLET ORAL
Qty: 0 | Refills: 0 | COMMUNITY

## 2018-12-06 RX ORDER — NALOXONE HYDROCHLORIDE 4 MG/.1ML
0.1 SPRAY NASAL
Qty: 0 | Refills: 0 | Status: DISCONTINUED | OUTPATIENT
Start: 2018-12-06 | End: 2018-12-12

## 2018-12-06 RX ORDER — HYDRALAZINE HCL 50 MG
10 TABLET ORAL ONCE
Qty: 0 | Refills: 0 | Status: COMPLETED | OUTPATIENT
Start: 2018-12-06 | End: 2018-12-06

## 2018-12-06 RX ORDER — RALTEGRAVIR 400 MG/1
1 TABLET, FILM COATED ORAL
Qty: 0 | Refills: 0 | COMMUNITY

## 2018-12-06 RX ORDER — EMTRICITABINE AND TENOFOVIR DISOPROXIL FUMARATE 200; 300 MG/1; MG/1
1 TABLET, FILM COATED ORAL
Qty: 0 | Refills: 0 | COMMUNITY

## 2018-12-06 RX ORDER — ONDANSETRON 8 MG/1
4 TABLET, FILM COATED ORAL EVERY 6 HOURS
Qty: 0 | Refills: 0 | Status: DISCONTINUED | OUTPATIENT
Start: 2018-12-06 | End: 2018-12-12

## 2018-12-06 RX ORDER — LABETALOL HCL 100 MG
200 TABLET ORAL ONCE
Qty: 0 | Refills: 0 | Status: COMPLETED | OUTPATIENT
Start: 2018-12-06 | End: 2018-12-06

## 2018-12-06 RX ORDER — SODIUM CHLORIDE 9 MG/ML
1000 INJECTION, SOLUTION INTRAVENOUS
Qty: 0 | Refills: 0 | Status: DISCONTINUED | OUTPATIENT
Start: 2018-12-06 | End: 2018-12-08

## 2018-12-06 RX ORDER — LABETALOL HCL 100 MG
200 TABLET ORAL
Qty: 0 | Refills: 0 | Status: DISCONTINUED | OUTPATIENT
Start: 2018-12-06 | End: 2018-12-12

## 2018-12-06 RX ORDER — EMTRICITABINE AND TENOFOVIR DISOPROXIL FUMARATE 200; 300 MG/1; MG/1
1 TABLET, FILM COATED ORAL DAILY
Qty: 0 | Refills: 0 | Status: DISCONTINUED | OUTPATIENT
Start: 2018-12-07 | End: 2018-12-12

## 2018-12-06 RX ORDER — SODIUM CHLORIDE 9 MG/ML
125 INJECTION, SOLUTION INTRAVENOUS ONCE
Qty: 0 | Refills: 0 | Status: DISCONTINUED | OUTPATIENT
Start: 2018-12-06 | End: 2018-12-08

## 2018-12-06 RX ORDER — OXYTOCIN 10 UNIT/ML
333.33 VIAL (ML) INJECTION
Qty: 20 | Refills: 0 | Status: DISCONTINUED | OUTPATIENT
Start: 2018-12-06 | End: 2018-12-08

## 2018-12-06 RX ORDER — RALTEGRAVIR 400 MG/1
400 TABLET, FILM COATED ORAL
Qty: 0 | Refills: 0 | Status: DISCONTINUED | OUTPATIENT
Start: 2018-12-06 | End: 2018-12-12

## 2018-12-06 RX ORDER — ACETAMINOPHEN 500 MG
650 TABLET ORAL ONCE
Qty: 0 | Refills: 0 | Status: COMPLETED | OUTPATIENT
Start: 2018-12-06 | End: 2018-12-06

## 2018-12-06 RX ORDER — OXYTOCIN 10 UNIT/ML
2 VIAL (ML) INJECTION
Qty: 30 | Refills: 0 | Status: DISCONTINUED | OUTPATIENT
Start: 2018-12-06 | End: 2018-12-12

## 2018-12-06 RX ADMIN — RALTEGRAVIR 400 MILLIGRAM(S): 400 TABLET, FILM COATED ORAL at 18:12

## 2018-12-06 RX ADMIN — Medication 2 MILLIUNIT(S)/MIN: at 10:38

## 2018-12-06 RX ADMIN — Medication 200 MILLIGRAM(S): at 18:12

## 2018-12-06 RX ADMIN — Medication 200 MILLIGRAM(S): at 10:28

## 2018-12-06 NOTE — PROGRESS NOTE ADULT - ASSESSMENT
39 yo  @ 39w, GBS neg, IOL for CHTN on labetalol, GDMA1, h/o Hi5 HAART, on pitocin  -continue current management  -f/u Upr.cr and Bps    Dr. Smith aware 37 yo  @ 39w, GBS neg, IOL for CHTN on labetalol, GDMA1, fetal macrosomia, h/o Hi5 HAART, on pitocin  -continue current management  -f/u Upr.cr and Bps    Dr. Smith aware

## 2018-12-06 NOTE — PROGRESS NOTE ADULT - SUBJECTIVE AND OBJECTIVE BOX
PGY1 Note    Patient seen at bedside for evaluation, feels mild contraction pain, otherwise doing well    T(F): 97.34 ( @ 19:37), Max: 98.42 ( @ 14:52)  HR: 69 ( @ 21:54)  BP: 119/65 ( @ 21:54) (102/52 - 170/83)  RR: 20 ( @ 09:56)  EFM: 130/mod/+accels/ recurrent variable decelerations recovering to baseline.  TOCO: q 1-3min  SVE: 5-6/80/-3 vertex    Medications:  labetalol: 200 ( @ 10:28)  labetalol: 200 ( @ 18:12)  oxytocin Infusion: 2 ( @ 10:25)  raltegravir Tablet: 400 ( @ 18:12)      Labs:                        10.5   7.13  )-----------( 209      ( 06 Dec 2018 10:22 )             31.7         135  |  101  |  8<L>  ----------------------------<  160<H>  3.8   |  18  |  0.9    Ca    9.1      06 Dec 2018 10:22    TPro  7.2  /  Alb  2.9<L>  /  TBili  0.4  /  DBili  x   /  AST  24  /  ALT  18  /  AlkPhos  151<H>      Prenatal Syphilis Test: Nonreact ( @ 10:22)  Drug Screen W/PCP, Urine: pending ( @ 10:22)  Uric Acid, Serum: 5.5 mg/dL ( @ 10:22)  Type + Screen: O POS (18 @ 10:16)  Antibody Screen: NEG (18 @ 10:16)    Urinalysis Basic - ( 06 Dec 2018 10:22 )    Color: Dark Yellow / Appearance: Cloudy / S.025 / pH: x  Gluc: x / Ketone: Negative  / Bili: Negative / Urobili: 0.2 mg/dL   Blood: x / Protein: 30 mg/dL / Nitrite: Negative   Leuk Esterase: Negative / RBC: x / WBC x   Sq Epi: x / Non Sq Epi: x / Bacteria: Few /HPF

## 2018-12-06 NOTE — OB PROVIDER H&P - ASSESSMENT
39yo , at 39w0d, GBS negative, IOL for GHTN and GDMA1, and is HIV positive with a negative viral load , rule out preeclampsia.   -Pitocin for IOL  -Admit to L+D  -Monitor EFM and TOCO   -IVF and labs  -Epidural PRN  -Clear liquid diet as tolerated    Case discussed with Dr. Nichols, and Dr. Smith aware 37yo , at 39w0d, GBS negative, IOL for GHTN and GDMA1, and is HIV positive with a negative viral load , rule out preeclampsia.   -Pitocin for IOL  -Admit to L+D  -Monitor EFM and TOCO   -IVF and labs  -Epidural PRN  -Clear liquid diet as tolerated  -fs [id number] in latent labor, q2 hours in active labor     Case discussed with Dr. Nichols, and Dr. Smith aware 39yo , at 39w0d, GBS negative, IOL for Chronic Hypertension and GDMA1, and is HIV positive with a negative viral load , and non-compliant, rule out preeclampsia.   -Pitocin for IOL  -Admit to L+D  -Monitor EFM and TOCO   -IVF and labs  -Epidural PRN  -Clear liquid diet as tolerated  -fs [id number] in latent labor, q2 hours in active labor     Case discussed with Dr. Nichols, and Dr. Smith aware 37yo , at 39w0d, GBS negative, IOL for Chronic Hypertension and GDMA1, and is HIV positive with a negative viral load , and non-compliant, rule out preeclampsia.   -Pitocin for IOL  -Admit to L+D  -Monitor EFM and TOCO   -IVF and labs  -Epidural PRN  -Clear liquid diet as tolerated  -fs [id number] in latent labor, q2 hours in active labor     Case discussed with Dr. Nichols, and Dr. Smith aware    I have reviewed above H&P  - Magdalena

## 2018-12-06 NOTE — OB PROVIDER H&P - NSHPLABSRESULTS_GEN_ALL_CORE
SONO  @ EFW 446g, posterior low lying placenta with normal insertion, MVP 59mm, normal anatomy with some suboptimal views, CL 44mm    SONO @ 24w6d SONO @ 19w6d EFW 446g, posterior low lying placenta with normal insertion, MVP 59mm, normal anatomy with some suboptimal views, CL 44mm  SONO @ 24w6d AGA on sonogram   SONO @ 28w6d EFW 1595 73%, vtx, MVP 57mm, BPP 8/8   SONO @ 32w6d EFW 2410g 65%, vtx, MVP 65mm, BPP 8/8    , GTT 98, 129, 214, 225     vertex by sono SONO @ 19w6d EFW 446g, posterior low lying placenta with normal insertion, MVP 59mm, normal anatomy with some suboptimal views, CL 44mm  SONO @ 24w6d AGA on sonogram   SONO @ 28w6d EFW 1595 73%, vtx, MVP 57mm, BPP 8/8   SONO @ 32w6d EFW 2410g 65%, vtx, MVP 65mm, BPP 8/8    , GTT 98, 129, 214, 225     vertex by sono    11/28/18: HIV-1 RNA Not detected   9/13/18: HIV-1 RNA not detected  05/07/18: HIV-1 RNA 83  05/04/18: HIV-1  SONO @ 19w6d EFW 446g, posterior low lying placenta with normal insertion, MVP 59mm, normal anatomy with some suboptimal views, CL 44mm  SONO @ 24w6d AGA on sonogram   SONO @ 28w6d EFW 1595 73%, vtx, MVP 57mm, BPP 8/8   SONO @ 32w6d EFW 2410g 65%, vtx, MVP 65mm, BPP 8/8  SONO @ 36w6d EFW 3704g, >90%, cephalic,  right placenta, no placenta previa, MVP 60mm,   , GTT 98, 129, 214, 225     vertex by sono    11/28/18: HIV-1 RNA Not detected   9/13/18: HIV-1 RNA not detected  05/07/18: HIV-1 RNA 83  05/04/18: HIV-1

## 2018-12-06 NOTE — PROCEDURE NOTE - SUPERVISORY STATEMENT
Epidural infusion ba.1% Bupivacaine  Rate = 3 ml/hr Epidural infusion ba.1% Bupivacaine  Rate = 3 ml/hr 2018 01:00 Pt requested Spinal Cath to be removed as she did not like being too numb. Epidural aborted as pt not able to sit still for more than 1 minute, pt asked to stop stating she is not able to do it.

## 2018-12-06 NOTE — PROGRESS NOTE ADULT - SUBJECTIVE AND OBJECTIVE BOX
PGY4 Note    Patient seen and examined at bedside in NAD. Reports feeling CTX pain, declines pain medication at this time    T(F): 98.4 (09:56)  HR: 72 (12:52)  BP: 128/64 (12:52)  RR: 20 (09:56)  EFM: 125, mod.last, + accels  TOCO:  q 2-3 min  SVE: deferred, last exam 3/30/-3    Medications:  (Floorstock): 1 Each (18 @ 10:36)  labetalol: 200 milliGRAM(s) (18 @ 10:28)  oxytocin Infusion: 8mu      Labs:                        10.5   7.13  )-----------( 209      ( 06 Dec 2018 10:22 )             31.7         135  |  101  |  8<L>  ----------------------------<  160<H>  3.8   |  18  |  0.9    Ca    9.1      06 Dec 2018 10:22    TPro  7.2  /  Alb  2.9<L>  /  TBili  0.4  /  DBili  x   /  AST  24  /  ALT  18  /  AlkPhos  151<H>        Urinalysis Basic - ( 06 Dec 2018 10:22 )    Color: Dark Yellow / Appearance: Cloudy / S.025 / pH: x  Gluc: x / Ketone: Negative  / Bili: Negative / Urobili: 0.2 mg/dL   Blood: x / Protein: 30 mg/dL / Nitrite: Negative   Leuk Esterase: Negative / RBC: x / WBC x   Sq Epi: x / Non Sq Epi: x / Bacteria: Few /HPF          A/P:   38y  at 39wk GA, GBS negative, GDMA1, cHTN on labetalol, HIV positive on HAART, non compliant with PNC for IOL   - c/w current management  - continuous toco/EFM  - pain management PRN    D/w Dr. Smith

## 2018-12-06 NOTE — OB PROVIDER H&P - HISTORY OF PRESENT ILLNESS
IOL at, GBS negative  Reports some contractions, denies LOF, VB, and reports good FM. Denies HA, blurry vision, chest pain, SOB, LE pain, RUQ pain. Patient takes finger sticks fasting average , 2hour postprandial , nightly postprandial 100-120. Patient saw cardiology in the beginning of the pregnancy for innocent murmor, EKG was normal, scheduled for 2D echo this upcoming Tuesday but did not go. Last time Labetalol use was yesterday morning. 39yo , at 39w0d, GBS negative, IOL for GHTN and GDMA1, and is HIV positive with a negative viral load .  Reports some contractions, denies LOF, VB, and reports good FM. Denies HA, blurry vision, chest pain, SOB, LE pain, RUQ pain. Patient takes finger sticks fasting average , 2hour postprandial , nightly postprandial 100-120. Patient saw cardiology in the beginning of the pregnancy for innocent murmor, EKG was normal, scheduled for 2D echo this upcoming Tuesday but did not go. Last time Labetalol use was yesterday morning. Last HIV meds today, takes raltegravir 400mg BID, takes Descovy 200mg/ 25mg once a day. 37yo , at 39w0d, GBS negative, IOL for Chronic Hypertension and GDMA1, and is HIV positive with a negative viral load .  Reports some contractions, denies LOF, VB, and reports good FM. Denies HA, blurry vision, chest pain, SOB, LE pain, RUQ pain. Patient takes finger sticks fasting average , 2hour postprandial , and fs at bedtime range 100-120. Patient saw cardiology in the beginning of the pregnancy for innocent murmur EKG was normal, scheduled for 2D echo this upcoming Tuesday but did not go. Non compliant with blood pressure medications, last Labetalol use was yesterday morning, and is ordered 200mg BID. Last HIV meds today, takes raltegravir 400mg BID, takes Descovy 200mg/ 25mg once a day.

## 2018-12-06 NOTE — PROGRESS NOTE ADULT - ASSESSMENT
39yo , at 39w0d, GBS negative, Chronic Hypertension r/o superimposed pre-eclampsia, GDMA1, HIV positive with a negative viral load  compliant with antiretrovirals, non-compliant with fingersticks, epidural in place, on pitocin, AROM clear, undergoing IOL  -Continuous EFM and toco  -IV fluids  -Pain management PRN  -C/w antiretrovirals  -BPs q15m  -f/u upr/cr  -c/w pitocin for induction    Discussed with Dr. Arevalo and Dr. Heredia

## 2018-12-06 NOTE — PROGRESS NOTE ADULT - ASSESSMENT
39yo , at 39w0d, GBS negative, IOL for Chronic Hypertension and GDMA1, and is HIV positive with a negative viral load , and non-compliant, rule out preeclampsia, in labor, doing well.   -Continue current management   -Pain management prn  -Continuous EFM/toco  -F/u pending labs      Dr. Nichols aware and Dr Smith aware

## 2018-12-06 NOTE — PROGRESS NOTE ADULT - SUBJECTIVE AND OBJECTIVE BOX
Pt unable to tolerate ctx's-anesthesai recommends re performing epidural  however, pt has some numbness and unable to sit up for epidural  will stop Pitocin to allow pan relief and allow epidrual to wear off so a new one can be replaced  bp elevated -likely pain  will observe

## 2018-12-06 NOTE — PROGRESS NOTE ADULT - SUBJECTIVE AND OBJECTIVE BOX
Patient seen and examined at bedside in NAD c/o chest tightness and tingling in upper extremities now resolved.  Denies any SOB, chest pain, palpitations.   FS: 95  BP: 136/61  HR: 50  O2 Sat: 97%  Pulm: CTA b/l  PE: UE  strength 5/5    Discussed with Dr Smith and Anesthesia, will continued to observe

## 2018-12-06 NOTE — PROGRESS NOTE ADULT - SUBJECTIVE AND OBJECTIVE BOX
PGY III Note    Patient seen at bedside for evaluation of labor progression.   Comfortable s/p epidural. Denies headache, blurry vision chest pain, SOB, RUQ/epigastric pain, swelling.    T(F): 98.4 (09:56)  HR: 65 (14:06)  BP: 128/60 (14:37)  RR: 20 (09:56)  ICU Vital Signs Last 24 Hrs  T(C): 36.9 (06 Dec 2018 09:56), Max: 36.9 (06 Dec 2018 09:56)  T(F): 98.4 (06 Dec 2018 09:56), Max: 98.4 (06 Dec 2018 09:56)  HR: 68 (06 Dec 2018 14:37) (61 - 82)  BP: 128/60 (06 Dec 2018 14:37) (121/56 - 170/83)  BP(mean): --  ABP: --  ABP(mean): --  RR: 20 (06 Dec 2018 09:56) (20 - 20)  SpO2: --      EFM: 130/mod last/+accels  TOCO: q2-3  SVE: 3/50/-3. vtx, intact    Labs:                        10.5   7.13  )-----------( 209      ( 06 Dec 2018 10:22 )             31.7     12-    135  |  101  |  8<L>  ----------------------------<  160<H>  3.8   |  18  |  0.9    Ca    9.1      06 Dec 2018 10:22    TPro  7.2  /  Alb  2.9<L>  /  TBili  0.4  /  DBili  x   /  AST  24  /  ALT  18  /  AlkPhos  151<H>  12-06      Urinalysis Basic - ( 06 Dec 2018 10:22 )    Color: Dark Yellow / Appearance: Cloudy / S.025 / pH: x  Gluc: x / Ketone: Negative  / Bili: Negative / Urobili: 0.2 mg/dL   Blood: x / Protein: 30 mg/dL / Nitrite: Negative   Leuk Esterase: Negative / RBC: x / WBC x   Sq Epi: x / Non Sq Epi: x / Bacteria: Few /HPF    Type + Screen: O POS (18 @ 10:16)        Meds:  labetalol 200 milliGRAM(s) Oral two times a day  oxytocin Infusion 2 milliUNIT(s)/Min IV Continuous <Continuous, started @ 1030 now on 8mu/min  raltegravir Tablet 400 milliGRAM(s) Oral two times a day-to be given tonight  descovy-to be given tomorrow

## 2018-12-06 NOTE — OB PROVIDER H&P - NS_OBGYNHISTORY_OBGYN_ALL_OB_FT
OB: NSVDX3, largest 8-4, full term, no complications. SABX2, no D+C.   1/28/01, f  03/18/10, m  1/11/15, m    Gyn: STI/STD deferred No ovarian cysts, fibroids, OB: NSVDX3, largest 8-4, full term, no complications. SABX2, no D+C.   1/28/01, f  03/18/10, m  1/11/15, m    Gyn: STI/STD denied per patient. No ovarian cysts, Denies fibroids. Positive history of fibroid in PMH documented.

## 2018-12-06 NOTE — OB PROVIDER H&P - PMH
Gestational diabetes    Gestational hypertension Gestational diabetes    Gestational hypertension    HIV (human immunodeficiency virus infection)

## 2018-12-06 NOTE — OB RN PATIENT PROFILE - EDUCATION PROVIDED ON BREASTFEEDING ASSESSMENT AND INSTRUCTION; INCLUDING POSITIONING, NEWBORN ATTACHMENT, AND COMFORT
Consent: The patient's consent was obtained including but not limited to risks of crusting, scabbing, blistering, scarring, darker or lighter pigmentary change, recurrence, incomplete removal and infection. Post-Care Instructions: I reviewed with the patient in detail post-care instructions. Patient is to wear sunprotection, and avoid picking at any of the treated lesions. Pt may apply Vaseline to crusted or scabbing areas. Detail Level: Simple Number Of Freeze-Thaw Cycles: 1 freeze-thaw cycle Render Post-Care Instructions In Note?: no Duration Of Freeze Thaw-Cycle (Seconds): 0 Statement Selected

## 2018-12-06 NOTE — PROCEDURE NOTE - ADDITIONAL PROCEDURE DETAILS
Pt very anxious  Challenging Epidural due to pt's elevated BMI (48), and anxiety level  Pt made sudden movement, = Wet tap, Spinal Cath threaded

## 2018-12-06 NOTE — PROGRESS NOTE ADULT - SUBJECTIVE AND OBJECTIVE BOX
PGY1 Note    Patient seen at bedside. No complaints at the moment.    T(F): 98.42 (12-06 @ 14:52), Max: 98.42 (12-06 @ 14:52)  HR: 59 (12-06 @ 16:32)  BP: 122/60 (12-06 @ 16:32) (114/56 - 170/83)  RR: 20 (12-06 @ 09:56)  EFM: 145bpm/ mod last/ accels+  TOCO: q3min  SVE: 4/60/-3, intact     Medications:  (Floorstock): 1 (12-06 @ 10:36)  (Floorstock): 1 (12-06 @ 15:33)  labetalol: 200 (12-06 @ 10:28)  oxytocin Infusion: 2 (12-06 @ 10:25)      Labs:                        10.5   7.13  )-----------( 209      ( 06 Dec 2018 10:22 )             31.7     12-06    135  |  101  |  8<L>  ----------------------------<  160<H>  3.8   |  18  |  0.9    Ca    9.1      06 Dec 2018 10:22    TPro  7.2  /  Alb  2.9<L>  /  TBili  0.4  /  DBili  x   /  AST  24  /  ALT  18  /  AlkPhos  151<H>  12-06    Prenatal Syphilis Test: Nonreact (12-06 @ 10:22)  Uric Acid, Serum: 5.5 mg/dL (12-06 @ 10:22)  Type + Screen: O POS (12-06-18 @ 10:16)  Antibody Screen: NEG (12-06-18 @ 10:16)

## 2018-12-06 NOTE — PROGRESS NOTE ADULT - ASSESSMENT
37yo , at 39w0d, GBS negative, Chronic Hypertension r/o superimposed pre-eclampsia, GDMA1, HIV positive with a negative viral load  compliant with antiretrovirals, non-compliant with fingersticks, with spinal anesthesia, on pitocin, AROM clear, undergoing IOL  -Continuous EFM and toco  -IV fluids  -Pain management PRN  -C/w antiretrovirals  -BPs q15m  -f/u upr/cr  -c/w pitocin for induction    Dr. Macias and Dr. Smith aware

## 2018-12-06 NOTE — CHART NOTE - NSCHARTNOTEFT_GEN_A_CORE
12/06/2018  23:17    Spinal Catheter +.  Motor block +, unable to flex knees.  Sensory level:  T 10 (Rt = Lt).  Pt states she still has "back pain".  Oxytocin at 18.    Patient had been offered to remove the Spinal Catheter and redo for an Epidural.  Pt had been refusing for the past 5-6 hours.  She is now receptive to have the Epidural redone.    Risks/Benefits of redoing an Epidural discussed including but not limited to spinal headache.  Pt understands and agrees to have Epidural redone.    Plan:  1.  d/c Oxytocin  2.  await recession of Motor block  3.  Redo Epidural procedure.    OB Attending in agreement of above plan.  Pt understands and agrees to above plan.

## 2018-12-06 NOTE — CHART NOTE - NSCHARTNOTEFT_GEN_A_CORE
12/06/2018  22:04    Pt uncomfortable.  Motor intact    Spinal Catheter dosed  -  0.25% Bupivacaine Plain, 2 ml    Patient states she feels better.    Well re-evaluate after 60 minutes

## 2018-12-06 NOTE — OB PROVIDER H&P - NSHPPHYSICALEXAM_GEN_ALL_CORE
Vital Signs Last 24 Hrs  HR: 82 (06 Dec 2018 09:27) (82 - 82)  BP: 162/73 (06 Dec 2018 09:27) (162/73 - 162/73)  BP(mean): --    FHR 140bpm/ mod last/ accels+  TOCO q3min  SVE     UDIP negative Vital Signs Last 24 Hrs  T(C): 36.9 (06 Dec 2018 09:56), Max: 36.9 (06 Dec 2018 09:56)  T(F): 98.4 (06 Dec 2018 09:56), Max: 98.4 (06 Dec 2018 09:56)  HR: 71 (06 Dec 2018 10:37) (61 - 82)  BP: 164/94 (06 Dec 2018 10:37) (136/63 - 170/83)  RR: 20 (06 Dec 2018 09:56) (20 - 20)      FHR 140bpm/ mod last/ accels+  TOCO q3min  SVE 3/30/-3, intact, OP  vertex by sono  EFW by leopolds 3000g    UDIP negative Vital Signs Last 24 Hrs  T(C): 36.9 (06 Dec 2018 09:56), Max: 36.9 (06 Dec 2018 09:56)  T(F): 98.4 (06 Dec 2018 09:56), Max: 98.4 (06 Dec 2018 09:56)  HR: 71 (06 Dec 2018 10:37) (61 - 82)  BP: 164/94 (06 Dec 2018 10:37) (136/63 - 170/83)  RR: 20 (06 Dec 2018 09:56) (20 - 20)    FHR 140bpm/ mod last/ accels+  TOCO q3min  SVE 3/30/-3, intact, OP  vertex by sono  EFW by leopolds 3000g    UDIP negative Vital Signs Last 24 Hrs  T(C): 36.9 (06 Dec 2018 09:56), Max: 36.9 (06 Dec 2018 09:56)  T(F): 98.4 (06 Dec 2018 09:56), Max: 98.4 (06 Dec 2018 09:56)  HR: 71 (06 Dec 2018 10:37) (61 - 82)  BP: 164/94 (06 Dec 2018 10:37) (136/63 - 170/83)  RR: 20 (06 Dec 2018 09:56) (20 - 20)  FS: 157     FHR 140bpm/ mod last/ accels+  TOCO q3min  SVE 3/30/-3, intact, OP  vertex by sono  EFW by leopolds 3000g    UDIP negative Vital Signs Last 24 Hrs  T(C): 36.9 (06 Dec 2018 09:56), Max: 36.9 (06 Dec 2018 09:56)  T(F): 98.4 (06 Dec 2018 09:56), Max: 98.4 (06 Dec 2018 09:56)  HR: 71 (06 Dec 2018 10:37) (61 - 82)  BP: 164/94 (06 Dec 2018 10:37) (136/63 - 170/83)  RR: 20 (06 Dec 2018 09:56) (20 - 20)  FS: 157     FHR 140bpm/ mod last/ accels+  TOCO q3min  SVE 3/30/-3, intact, OP  vertex by sono  EFW by leopolds 4000    UDIP negative

## 2018-12-06 NOTE — CHART NOTE - NSCHARTNOTEFT_GEN_A_CORE
12/06/2018  20:35    Pt with Spinal Cath  Upon inquiry, pt states she would like "just a little bit of medication".    Spinal Cath dosed:  0.25% Bupivacaine Plain, 2 ml

## 2018-12-06 NOTE — PROGRESS NOTE ADULT - SUBJECTIVE AND OBJECTIVE BOX
PGY2 Note:    Pt evaluated at bedside, feeling mild contraction pain, overall pain well controlled. Discussed labor course and lack of cervical change since beginning of induction with patient, as well as overall risk of transmission of HIV to . Discussed that there is no clear evidence of harm to  with AROM in the setting of undetectable viral load, however there is possibility that long duration (particularly greater than 25 hours) may pose increased risk of transmission. Patient voiced an understanding and requested to proceed with AROM.    Vital Signs Last 24 Hrs  T(F): 97.34 (06 Dec 2018 19:37), Max: 98.42 (06 Dec 2018 14:52)  HR: 64 (06 Dec 2018 19:59) (49 - 82)  BP: 125/87 (06 Dec 2018 19:53) (102/52 - 170/83)  RR: 20 (06 Dec 2018 09:56) (20 - 20)    EFM: 135/mod/accels  [place]: q2-3m  SVE: 4/80/-1, vertex, AROM clear    Labs:                        10.5   7.13  )-----------( 209      ( 06 Dec 2018 10:22 )             31.7         Meds:    raltegravir Tablet   400 milliGRAM(s) Oral (18 @ 18:12)  Pitocin started at 1109, now at 16 mu/min

## 2018-12-06 NOTE — PROGRESS NOTE ADULT - SUBJECTIVE AND OBJECTIVE BOX
PGY 3 note    Patient seen at bedside and counseled on AZT. Explained to patient that new recommendations state that the risk for transmission of HIV to the infant when the viral load is <1000 is negligible and AZT intrapartum is not necessary. However some experts say that AZT may be offered to the patient.    Explained to patient that given her HIV viral load has been negative her entire pregnancy and she is compliant with her meds it is unlikely that she would have an elevated viral load right before delivery, and that AZT is not necessary, but is optional.    Patient declined and verbalized understanding.    Dr. Smith at bedside.

## 2018-12-07 DIAGNOSIS — O24.410 GESTATIONAL DIABETES MELLITUS IN PREGNANCY, DIET CONTROLLED: ICD-10-CM

## 2018-12-07 DIAGNOSIS — O10.913 UNSPECIFIED PRE-EXISTING HYPERTENSION COMPLICATING PREGNANCY, THIRD TRIMESTER: ICD-10-CM

## 2018-12-07 DIAGNOSIS — O98.713 HUMAN IMMUNODEFICIENCY VIRUS [HIV] DISEASE COMPLICATING PREGNANCY, THIRD TRIMESTER: ICD-10-CM

## 2018-12-07 DIAGNOSIS — O09.93 SUPERVISION OF HIGH RISK PREGNANCY, UNSPECIFIED, THIRD TRIMESTER: ICD-10-CM

## 2018-12-07 DIAGNOSIS — Z71.89 OTHER SPECIFIED COUNSELING: ICD-10-CM

## 2018-12-07 DIAGNOSIS — O09.523 SUPERVISION OF ELDERLY MULTIGRAVIDA, THIRD TRIMESTER: ICD-10-CM

## 2018-12-07 DIAGNOSIS — Z3A.38 38 WEEKS GESTATION OF PREGNANCY: ICD-10-CM

## 2018-12-07 PROBLEM — I10 ESSENTIAL (PRIMARY) HYPERTENSION: Chronic | Status: INACTIVE | Noted: 2018-02-08 | Resolved: 2018-12-06

## 2018-12-07 LAB
ALBUMIN SERPL ELPH-MCNC: 2.7 G/DL — LOW (ref 3.5–5.2)
ALP SERPL-CCNC: 124 U/L — HIGH (ref 30–115)
ALT FLD-CCNC: 17 U/L — SIGNIFICANT CHANGE UP (ref 0–41)
ANION GAP SERPL CALC-SCNC: 15 MMOL/L — HIGH (ref 7–14)
AST SERPL-CCNC: 27 U/L — SIGNIFICANT CHANGE UP (ref 0–41)
BILIRUB SERPL-MCNC: 0.5 MG/DL — SIGNIFICANT CHANGE UP (ref 0.2–1.2)
BUN SERPL-MCNC: 8 MG/DL — LOW (ref 10–20)
CALCIUM SERPL-MCNC: 8 MG/DL — LOW (ref 8.5–10.1)
CHLORIDE SERPL-SCNC: 99 MMOL/L — SIGNIFICANT CHANGE UP (ref 98–110)
CO2 SERPL-SCNC: 20 MMOL/L — SIGNIFICANT CHANGE UP (ref 17–32)
CREAT SERPL-MCNC: 0.8 MG/DL — SIGNIFICANT CHANGE UP (ref 0.7–1.5)
GLUCOSE BLDC GLUCOMTR-MCNC: 124 MG/DL — HIGH (ref 70–99)
GLUCOSE BLDC GLUCOMTR-MCNC: 139 MG/DL — HIGH (ref 70–99)
GLUCOSE SERPL-MCNC: 138 MG/DL — HIGH (ref 70–99)
HCT VFR BLD CALC: 28.1 % — LOW (ref 37–47)
HGB BLD-MCNC: 9 G/DL — LOW (ref 12–16)
LDH SERPL L TO P-CCNC: 240 — SIGNIFICANT CHANGE UP (ref 50–242)
MAGNESIUM SERPL-MCNC: 4 MG/DL — CRITICAL HIGH (ref 1.8–2.4)
MAGNESIUM SERPL-MCNC: 4.8 MG/DL — CRITICAL HIGH (ref 1.8–2.4)
MCHC RBC-ENTMCNC: 26.4 PG — LOW (ref 27–31)
MCHC RBC-ENTMCNC: 32 G/DL — SIGNIFICANT CHANGE UP (ref 32–37)
MCV RBC AUTO: 82.4 FL — SIGNIFICANT CHANGE UP (ref 81–99)
NRBC # BLD: 0 /100 WBCS — SIGNIFICANT CHANGE UP (ref 0–0)
PLATELET # BLD AUTO: 194 K/UL — SIGNIFICANT CHANGE UP (ref 130–400)
POTASSIUM SERPL-MCNC: 4.1 MMOL/L — SIGNIFICANT CHANGE UP (ref 3.5–5)
POTASSIUM SERPL-SCNC: 4.1 MMOL/L — SIGNIFICANT CHANGE UP (ref 3.5–5)
PROT SERPL-MCNC: 6.1 G/DL — SIGNIFICANT CHANGE UP (ref 6–8)
RBC # BLD: 3.41 M/UL — LOW (ref 4.2–5.4)
RBC # FLD: 15.2 % — HIGH (ref 11.5–14.5)
SODIUM SERPL-SCNC: 134 MMOL/L — LOW (ref 135–146)
URATE SERPL-MCNC: 6.2 MG/DL — SIGNIFICANT CHANGE UP (ref 2.5–7)
WBC # BLD: 11.31 K/UL — HIGH (ref 4.8–10.8)
WBC # FLD AUTO: 11.31 K/UL — HIGH (ref 4.8–10.8)

## 2018-12-07 RX ORDER — ENOXAPARIN SODIUM 100 MG/ML
40 INJECTION SUBCUTANEOUS AT BEDTIME
Qty: 0 | Refills: 0 | Status: DISCONTINUED | OUTPATIENT
Start: 2018-12-07 | End: 2018-12-12

## 2018-12-07 RX ORDER — ONDANSETRON 8 MG/1
2 TABLET, FILM COATED ORAL EVERY 6 HOURS
Qty: 0 | Refills: 0 | Status: DISCONTINUED | OUTPATIENT
Start: 2018-12-07 | End: 2018-12-12

## 2018-12-07 RX ORDER — LABETALOL HCL 100 MG
20 TABLET ORAL ONCE
Qty: 0 | Refills: 0 | Status: COMPLETED | OUTPATIENT
Start: 2018-12-07 | End: 2018-12-07

## 2018-12-07 RX ORDER — IBUPROFEN 200 MG
600 TABLET ORAL EVERY 6 HOURS
Qty: 0 | Refills: 0 | Status: DISCONTINUED | OUTPATIENT
Start: 2018-12-07 | End: 2018-12-10

## 2018-12-07 RX ORDER — CEFAZOLIN SODIUM 1 G
3000 VIAL (EA) INJECTION EVERY 8 HOURS
Qty: 0 | Refills: 0 | Status: COMPLETED | OUTPATIENT
Start: 2018-12-07 | End: 2018-12-07

## 2018-12-07 RX ORDER — HYDROMORPHONE HYDROCHLORIDE 2 MG/ML
2 INJECTION INTRAMUSCULAR; INTRAVENOUS; SUBCUTANEOUS ONCE
Qty: 0 | Refills: 0 | Status: DISCONTINUED | OUTPATIENT
Start: 2018-12-07 | End: 2018-12-07

## 2018-12-07 RX ORDER — OXYCODONE AND ACETAMINOPHEN 5; 325 MG/1; MG/1
2 TABLET ORAL EVERY 4 HOURS
Qty: 0 | Refills: 0 | Status: DISCONTINUED | OUTPATIENT
Start: 2018-12-07 | End: 2018-12-12

## 2018-12-07 RX ORDER — HYDROMORPHONE HYDROCHLORIDE 2 MG/ML
0.5 INJECTION INTRAMUSCULAR; INTRAVENOUS; SUBCUTANEOUS
Qty: 0 | Refills: 0 | Status: DISCONTINUED | OUTPATIENT
Start: 2018-12-07 | End: 2018-12-12

## 2018-12-07 RX ORDER — HYDRALAZINE HCL 50 MG
10 TABLET ORAL ONCE
Qty: 0 | Refills: 0 | Status: COMPLETED | OUTPATIENT
Start: 2018-12-07 | End: 2018-12-07

## 2018-12-07 RX ORDER — MAGNESIUM SULFATE 500 MG/ML
2 VIAL (ML) INJECTION
Qty: 40 | Refills: 0 | Status: DISCONTINUED | OUTPATIENT
Start: 2018-12-07 | End: 2018-12-08

## 2018-12-07 RX ORDER — LANOLIN
1 OINTMENT (GRAM) TOPICAL
Qty: 0 | Refills: 0 | Status: DISCONTINUED | OUTPATIENT
Start: 2018-12-07 | End: 2018-12-12

## 2018-12-07 RX ORDER — DIPHENHYDRAMINE HCL 50 MG
25 CAPSULE ORAL EVERY 6 HOURS
Qty: 0 | Refills: 0 | Status: DISCONTINUED | OUTPATIENT
Start: 2018-12-07 | End: 2018-12-12

## 2018-12-07 RX ORDER — OXYCODONE AND ACETAMINOPHEN 5; 325 MG/1; MG/1
1 TABLET ORAL
Qty: 0 | Refills: 0 | Status: DISCONTINUED | OUTPATIENT
Start: 2018-12-07 | End: 2018-12-12

## 2018-12-07 RX ORDER — SODIUM CHLORIDE 9 MG/ML
1000 INJECTION, SOLUTION INTRAVENOUS
Qty: 0 | Refills: 0 | Status: DISCONTINUED | OUTPATIENT
Start: 2018-12-07 | End: 2018-12-12

## 2018-12-07 RX ORDER — CEFAZOLIN SODIUM 1 G
3000 VIAL (EA) INJECTION ONCE
Qty: 0 | Refills: 0 | Status: COMPLETED | OUTPATIENT
Start: 2018-12-07 | End: 2018-12-07

## 2018-12-07 RX ORDER — OXYTOCIN 10 UNIT/ML
125 VIAL (ML) INJECTION
Qty: 20 | Refills: 0 | Status: DISCONTINUED | OUTPATIENT
Start: 2018-12-07 | End: 2018-12-12

## 2018-12-07 RX ORDER — MAGNESIUM SULFATE 500 MG/ML
4 VIAL (ML) INJECTION ONCE
Qty: 0 | Refills: 0 | Status: COMPLETED | OUTPATIENT
Start: 2018-12-07 | End: 2018-12-07

## 2018-12-07 RX ORDER — GLYCERIN ADULT
1 SUPPOSITORY, RECTAL RECTAL AT BEDTIME
Qty: 0 | Refills: 0 | Status: DISCONTINUED | OUTPATIENT
Start: 2018-12-07 | End: 2018-12-12

## 2018-12-07 RX ORDER — DOCUSATE SODIUM 100 MG
100 CAPSULE ORAL
Qty: 0 | Refills: 0 | Status: DISCONTINUED | OUTPATIENT
Start: 2018-12-07 | End: 2018-12-08

## 2018-12-07 RX ORDER — SIMETHICONE 80 MG/1
80 TABLET, CHEWABLE ORAL EVERY 4 HOURS
Qty: 0 | Refills: 0 | Status: DISCONTINUED | OUTPATIENT
Start: 2018-12-07 | End: 2018-12-08

## 2018-12-07 RX ORDER — MEPERIDINE HYDROCHLORIDE 50 MG/ML
12.5 INJECTION INTRAMUSCULAR; INTRAVENOUS; SUBCUTANEOUS
Qty: 0 | Refills: 0 | Status: DISCONTINUED | OUTPATIENT
Start: 2018-12-07 | End: 2018-12-12

## 2018-12-07 RX ORDER — AZITHROMYCIN 500 MG/1
1000 TABLET, FILM COATED ORAL ONCE
Qty: 0 | Refills: 0 | Status: COMPLETED | OUTPATIENT
Start: 2018-12-07 | End: 2018-12-07

## 2018-12-07 RX ORDER — ONDANSETRON 8 MG/1
4 TABLET, FILM COATED ORAL ONCE
Qty: 0 | Refills: 0 | Status: DISCONTINUED | OUTPATIENT
Start: 2018-12-07 | End: 2018-12-12

## 2018-12-07 RX ORDER — KETOROLAC TROMETHAMINE 30 MG/ML
30 SYRINGE (ML) INJECTION ONCE
Qty: 0 | Refills: 0 | Status: DISCONTINUED | OUTPATIENT
Start: 2018-12-07 | End: 2018-12-09

## 2018-12-07 RX ADMIN — Medication 100 MILLIGRAM(S): at 22:42

## 2018-12-07 RX ADMIN — HYDROMORPHONE HYDROCHLORIDE 2 MILLIGRAM(S): 2 INJECTION INTRAMUSCULAR; INTRAVENOUS; SUBCUTANEOUS at 01:42

## 2018-12-07 RX ADMIN — Medication 600 MILLIGRAM(S): at 13:24

## 2018-12-07 RX ADMIN — Medication 10 MILLIGRAM(S): at 00:09

## 2018-12-07 RX ADMIN — EMTRICITABINE AND TENOFOVIR DISOPROXIL FUMARATE 1 TABLET(S): 200; 300 TABLET, FILM COATED ORAL at 12:13

## 2018-12-07 RX ADMIN — ENOXAPARIN SODIUM 40 MILLIGRAM(S): 100 INJECTION SUBCUTANEOUS at 22:43

## 2018-12-07 RX ADMIN — AZITHROMYCIN 260 MILLIGRAM(S): 500 TABLET, FILM COATED ORAL at 06:09

## 2018-12-07 RX ADMIN — Medication 10 MILLIGRAM(S): at 03:18

## 2018-12-07 RX ADMIN — RALTEGRAVIR 400 MILLIGRAM(S): 400 TABLET, FILM COATED ORAL at 17:59

## 2018-12-07 RX ADMIN — Medication 200 MILLIGRAM(S): at 09:16

## 2018-12-07 RX ADMIN — HYDROMORPHONE HYDROCHLORIDE 2 MILLIGRAM(S): 2 INJECTION INTRAMUSCULAR; INTRAVENOUS; SUBCUTANEOUS at 03:55

## 2018-12-07 RX ADMIN — Medication 200 MILLIGRAM(S): at 06:56

## 2018-12-07 RX ADMIN — RALTEGRAVIR 400 MILLIGRAM(S): 400 TABLET, FILM COATED ORAL at 09:15

## 2018-12-07 RX ADMIN — SODIUM CHLORIDE 125 MILLILITER(S): 9 INJECTION, SOLUTION INTRAVENOUS at 04:44

## 2018-12-07 RX ADMIN — Medication 650 MILLIGRAM(S): at 20:00

## 2018-12-07 RX ADMIN — Medication 50 GM/HR: at 09:15

## 2018-12-07 RX ADMIN — Medication 10 MILLIGRAM(S): at 02:49

## 2018-12-07 RX ADMIN — HYDROMORPHONE HYDROCHLORIDE 2 MILLIGRAM(S): 2 INJECTION INTRAMUSCULAR; INTRAVENOUS; SUBCUTANEOUS at 02:27

## 2018-12-07 RX ADMIN — Medication 200 MILLIGRAM(S): at 17:59

## 2018-12-07 RX ADMIN — Medication 300 GRAM(S): at 04:20

## 2018-12-07 RX ADMIN — Medication 50 GM/HR: at 04:43

## 2018-12-07 RX ADMIN — Medication 600 MILLIGRAM(S): at 12:43

## 2018-12-07 RX ADMIN — Medication 100 MILLIGRAM(S): at 14:01

## 2018-12-07 RX ADMIN — Medication 600 MILLIGRAM(S): at 19:10

## 2018-12-07 RX ADMIN — Medication 20 MILLIGRAM(S): at 03:52

## 2018-12-07 NOTE — CHART NOTE - NSCHARTNOTEFT_GEN_A_CORE
PGY1 note    Patient seen and evaluated at bedside for blood pressure remaining in the severe range 170/81. patient remains asymptomatic except for increased contraction pain  Hydralazine 10mg @0249  hydralazine 10mg @0319    so labetalol 20mg IV push was given @0355    Will follow up repeat BP  Will start Magnesium 4mg Bolus with 2 mg/h IV infusion  Dilaudid 2mg for pain management    Dr. Macias and Dr. Smith aware

## 2018-12-07 NOTE — PROGRESS NOTE ADULT - ASSESSMENT
A/P: 38y s/p  section for arrest of dilation, with HIV and  preeclampsia with severe features, on magnesium for seizure prophylaxis  - continue magnesium   - next labs and magnesium level at 1430  - f/u BPs; Current BPs not severe   - stricts I/Os  - Seizure precautions  - Reevaluate in 2hours     Case discussed with Dr. Nichols, Dr. Hernandez to be aware

## 2018-12-07 NOTE — BRIEF OPERATIVE NOTE - POST-OP DX
Arrest of dilation, delivered, current hospitalization  12/07/2018    Dottie Zhang  Chronic hypertension with superimposed pre-eclampsia  12/07/2018    Dottie Zhang  Diet controlled gestational diabetes mellitus (GDM) in third trimester  12/07/2018    Dottie Zhang  HIV (human immunodeficiency virus infection)  12/07/2018    Dottie Zhang

## 2018-12-07 NOTE — PROGRESS NOTE ADULT - ASSESSMENT
A/P: 37yo P4 S/P C/S for arrest of dilation, HIV on antiretroviral meds; cHTN with superimposed preeclampsia with severe features, GDMA1, on Mg+ for seizure prophylaxis, BPs currently well controlled   - f/u CBC, Mg level @ 2000  - c/w Mg until 24hr PP (12/8 at 0707)   - watch for Mg toxicity, neuro checks q2h   - strict I/Os   - seizure precautions   - labetalol 200mg BID   - c/w antiretroviral meds   - f/u SW consult   - mucinex     Dr. Pascual and Dr. Morales to be made aware.

## 2018-12-07 NOTE — PROGRESS NOTE ADULT - SUBJECTIVE AND OBJECTIVE BOX
Pt seen and discussion of  has  pt has made no change x 4 hrs.    Iol being performed with a pt who is gdm and macrosomia, pitocin was stopped in active phase of labor.  risks: anesthesia (possibel general) bleeding, transfusion, infection (higher due to HIV status), injury to other orgasn-bowel/bladder, dvt/pe  pt signed btl papers-100% irreversible, increased risk of ectopic if pregnancy occues, failure 1%  all questions answered

## 2018-12-07 NOTE — PROGRESS NOTE ADULT - SUBJECTIVE AND OBJECTIVE BOX
PGY1 Magnesium Note     Subjective:   Pt evaluated at bedside for magnesium check. She denies headache, vision changes, dizziness, SOB, chest pain, RUQ/epigastric pain.    Objective:   Vital signs: T(F): 98 (18 @ 09:37), Max: 98.78 (18 @ 04:20)  HR: 88 (18 @ 11:11) (53 - 110)  BP: 128/67 (18 @ 11:07) (118/58 - 199/102)  SpO2: 97% (18 @ 11:11) (81% - 100%)    18 @ 07:01  -  18 @ 07:00  --------------------------------------------------------  IN: 1275 mL / OUT: 850 mL / NET: 425 mL    18 @ 07:01  -  18 @ 11:14  --------------------------------------------------------  IN: 125 mL / OUT: 325 mL / NET: -200 mL          Gen: NAD, AAOx3  CV: S1S2, RRR, no M/R/G  Pulm: CTAB, no rhonchi or rales or wheezing  Ext: no calf tenderness or edema SCDs in place  Neuro: 1+ DTR bilaterally  Abd: soft, no epigastric tenderness  LOCHIA: minimal  INCISION: bandage in place       Medications:    (Floorstock): 1 (18 @ 06:59)  acetaminophen   Tablet ..: 650 (18 @ 20:00)  azithromycin  IVPB: 260 (18 @ 06:09)  ceFAZolin   IVPB: 200 (18 @ 06:56)  hydrALAZINE Injectable: 10 (18 @ 00:09)  hydrALAZINE Injectable: 10 (18 @ 02:49)  hydrALAZINE Injectable: 10 (18 @ 03:18)  HYDROmorphone  Injectable: 2 (18 @ 03:55)  HYDROmorphone  Injectable: 2 (18 @ 01:42)  labetalol: 200 (18 @ 09:16),  200 (18 @ 18:12)  labetalol Injectable: 20 (18 @ 03:52)  magnesium sulfate  IVPB: 300 (18 @ 04:20)  magnesium sulfate Infusion: 50 (18 @ 03:40)  magnesium sulfate Infusion: 50 (18 @ 08:28)  raltegravir Tablet: 400 (18 @ 09:15),  400 (18 @ 18:12)      UO: 4178-4961 75cc, adequate 68cc/hr  Labs:                         10.5   7.13  )-----------( 209      ( 06 Dec 2018 10:22 )             31.7       135  |  101  |  8<L>  ----------------------------<  160<H>  3.8   |  18  |  0.9    Ca    9.1      06 Dec 2018 10:22    TPro  7.2  /  Alb  2.9<L>  /  TBili  0.4  /  DBili  x   /  AST  24  /  ALT  18  /  AlkPhos  151<H>  12-06      Urinalysis Basic - ( 06 Dec 2018 10:22 )  Color: Dark Yellow / Appearance: Cloudy / S.025 / pH: x  Gluc: x / Ketone: Negative  / Bili: Negative / Urobili: 0.2 mg/dL   Blood: x / Protein: 30 mg/dL / Nitrite: Negative   Leuk Esterase: Negative / RBC: x / WBC x   Sq Epi: x / Non Sq Epi: x / Bacteria: Few /HPF

## 2018-12-07 NOTE — BRIEF OPERATIVE NOTE - OPERATION/FINDINGS
Live male infant delivered in vertex presentation, no nuchal cord, clear fluid, birthweight 3970g and APGARS 9 and 9. Uterus exteriorized, b/l tubes and ovaries grossly normal. Bilateral tubal ligation done via Staples method.

## 2018-12-07 NOTE — PROGRESS NOTE ADULT - SUBJECTIVE AND OBJECTIVE BOX
PGY1 Note    Patient seen at bedside for evaluation, in increasing contraction pain s/p 2 failed epidural trials.     T(F): 96.98 ( @ 23:41), Max: 98.42 ( @ 14:52)  HR: 85 ( @ 01:18)  BP: 156/80 ( @ 01:08) (102/52 - 186/74)  RR: 20 ( @ 09:56)  EFM:130/mod/+accels  TOCO: q 1-6min  SVE: 780/-1    Medications:    acetaminophen   Tablet ..: 650 ( @ 20:00)  hydrALAZINE Injectable: 10 ( @ 00:09)  labetalol: 200 ( @ 10:28)  labetalol: 200 ( @ 18:12)  oxytocin Infusion: 2 ( @ 10:25)  raltegravir Tablet: 400 ( @ 18:12)      Labs:                        10.5   7.13  )-----------( 209      ( 06 Dec 2018 10:22 )             31.7         135  |  101  |  8<L>  ----------------------------<  160<H>  3.8   |  18  |  0.9    Ca    9.1      06 Dec 2018 10:22    TPro  7.2  /  Alb  2.9<L>  /  TBili  0.4  /  DBili  x   /  AST  24  /  ALT  18  /  AlkPhos  151<H>      Prenatal Syphilis Test: Nonreact ( @ 10:22)  Drug Screen W/PCP, Urine: pending ( @ 10:22)  Uric Acid, Serum: 5.5 mg/dL ( @ 10:22)  Type + Screen: O POS (18 @ 10:16)  Antibody Screen: NEG (18 @ 10:16)    Urinalysis Basic - ( 06 Dec 2018 10:22 )    Color: Dark Yellow / Appearance: Cloudy / S.025 / pH: x  Gluc: x / Ketone: Negative  / Bili: Negative / Urobili: 0.2 mg/dL   Blood: x / Protein: 30 mg/dL / Nitrite: Negative   Leuk Esterase: Negative / RBC: x / WBC x   Sq Epi: x / Non Sq Epi: x / Bacteria: Few /HPF

## 2018-12-07 NOTE — PROGRESS NOTE ADULT - SUBJECTIVE AND OBJECTIVE BOX
PGY 3 note    S: Pt evaluated at bedside for magnesium check. She denies visual disturbances and right upper quadrant pain. Also denies nausea/vomiting/chest pain/epigastric pain/shortness of breath. Pain well controlled, only c/o mild headache, improved with tylenol.    O:  T(C): 36.7 (18 @ 09:37), Max: 37.1 (18 @ 04:20)  HR: 88 (18 @ 14:00) (65 - 110)  BP: 132/- (18 @ 14:00) (117/57 - 199/102)  RR: 18 (visual)  SpO2: 97% (18 @ 11:16) (81% - 100%)    Vital Signs Last 24 Hrs  BP: 132/- (07 Dec 2018 14:00) (102/52 - 199/102)  Daily     Daily Baby A: Weight (gm) Delivery: 3970 (07 Dec 2018 07:09)     @ 07:01  -   @ 07:00  --------------------------------------------------------  IN: 1275 mL / OUT: 850 mL / NET: 425 mL     @ 07:01  -   @ 14:19  --------------------------------------------------------  IN: 500 mL / OUT: 950 mL / NET: -450 mL      Gen: NAD, AAOx3  CV: RRR, no M/R/G  Pulm: CTAB, no R/R/W  Abd: soft, obese, nontender, no rebound or guarding, no epigastric tenderness, liver nonpalpable +BS.   : Coleman   Ext: +2 edema pola, SCDs in place, no calf tenderness  Neuro: Reflexes 2+ b/l upper ext    ceFAZolin   IVPB 3000 milliGRAM(s) IV Intermittent every 8 hours  diphenhydrAMINE 25 milliGRAM(s) Oral every 6 hours PRN  docusate sodium 100 milliGRAM(s) Oral two times a day PRN  emtricitabine 200 mG/tenofovir alafenamide 25 mG (DESCOVY) Tablet 1 Tablet(s) Oral daily  enoxaparin Injectable 40 milliGRAM(s) SubCutaneous at bedtime  fentaNYL (2 MICROgram(s)/mL) + BUpivacaine 0.1%  in Sodium Chloride 0.9% Epidural Drip 250 milliLiter(s) Epidural Drip <Continuous>  glycerin Suppository - Adult 1 Suppository(s) Rectal at bedtime PRN  HYDROmorphone  Injectable 0.5 milliGRAM(s) IV Push every 10 minutes PRN  ibuprofen  Tablet. 600 milliGRAM(s) Oral every 6 hours PRN  ketorolac   Injectable 30 milliGRAM(s) IV Push once PRN  labetalol 200 milliGRAM(s) Oral two times a day  lactated ringers Bolus 125 milliLiter(s) IV Bolus once  lactated ringers. 1000 milliLiter(s) IV Continuous <Continuous>  lactated ringers. 1000 milliLiter(s) IV Continuous <Continuous>  lanolin Ointment 1 Application(s) Topical every 3 hours PRN  magnesium sulfate Infusion 2 Gm/Hr IV Continuous <Continuous>  magnesium sulfate Infusion 2 Gm/Hr IV Continuous <Continuous>  meperidine     Injectable 12.5 milliGRAM(s) IV Push every 10 minutes PRN  naloxone Injectable 0.1 milliGRAM(s) IV Push every 3 minutes PRN  ondansetron Injectable 4 milliGRAM(s) IV Push every 6 hours PRN  ondansetron Injectable 4 milliGRAM(s) IV Push once PRN  ondansetron Injectable 2 milliGRAM(s) IV Push every 6 hours PRN  oxyCODONE    5 mG/acetaminophen 325 mG 1 Tablet(s) Oral every 3 hours PRN  oxyCODONE    5 mG/acetaminophen 325 mG 2 Tablet(s) Oral every 4 hours PRN  oxytocin Infusion 125 milliUNIT(s)/Min IV Continuous <Continuous>  oxytocin Infusion 333.333 milliUNIT(s)/Min IV Continuous <Continuous>  oxytocin Infusion 2 milliUNIT(s)/Min IV Continuous <Continuous>  raltegravir Tablet 400 milliGRAM(s) Oral two times a day  simethicone 80 milliGRAM(s) Chew every 4 hours PRN    No Known Allergies                            10.5   7.13  )-----------( 209      ( 06 Dec 2018 10:22 )             31.7     12-    135  |  101  |  8<L>  ----------------------------<  160<H>  3.8   |  18  |  0.9    Ca    9.1      06 Dec 2018 10:22    TPro  7.2  /  Alb  2.9<L>  /  TBili  0.4  /  DBili  x   /  AST  24  /  ALT  18  /  AlkPhos  151<H>  12      urine pr/cr 0.2    A/P: 38y P4 s/p  section for arrest of dilation, with HIV, preeclampsia with severe features, on magnesium for seizure prophylaxis,  - continue magnesium until 24hrs postpartum,  07:07  - Mg+ and PELs drawn now   - f/u BPs; Current BPs not severe   - stricts I/Os  - Seizure precautions  - Reevaluate in 2hours     Case discussed with Dr. Nichols and Dr. Hernandez

## 2018-12-07 NOTE — CHART NOTE - NSCHARTNOTEFT_GEN_A_CORE
PGY1 note    Patient seen and evaluated at bedside for evaluation of blood pressures in the severe range 165/78 @0224 -->171/79 @0239. patient reports pain with contractions that has been alleviated with Dilaudid. Denies headache, chest pain, SOB, visual changes, n/v, RUQ pain or new onset edema.    In the setting of 2 severe blood pressure 15min apart, 10mg of Hydralazine IV push was given    To follow up BP repeat in 20min    Dr. Macias and Dr. Smith aware

## 2018-12-07 NOTE — CHART NOTE - NSCHARTNOTEFT_GEN_A_CORE
PGY2 Note    Pt seen at bedside for severe range /84 @ 2341, 170/86 @2356. Pt continues to report painful contractions, pitocin off while waiting for spinal anesthesia to wear off and to re-attempt epidural. Denies headache, dizziness, vision changes, CP, SOB, RUQ/epigastric pain or increased LE swelling.     Gen: NAD, AAOx3  CVS: RRR, normal S1, S2  Lungs: CTAB  Abd: soft, non tender, no RUQ/epigastric tenderness  LE: 1+ edema, unchanged from prior, non tender  Neuro: unable to elicit reflexes    Hydralazine 10mg given 0009, repeat /90. Will continue to monitor BPs. Will consider Mag if continues to have elevated BPs.   -C/w Pain mgt    Dr. Macias and Dr. Rojo aware.

## 2018-12-07 NOTE — BRIEF OPERATIVE NOTE - PROCEDURE
<<-----Click on this checkbox to enter Procedure Tubal ligation with  section  2018    Active  SYUEN1   delivery  2018    Active  SYUEN1

## 2018-12-07 NOTE — CHART NOTE - NSCHARTNOTEFT_GEN_A_CORE
PACU ANESTHESIA ADMISSION NOTE      Procedure: cesarian section  Post op diagnosis: arrest of dialation    ____  Intubated  TV:______       Rate: ______      FiO2: ______    _x___  Patent Airway    _x___  Full return of protective reflexes    _x___  Full recovery from anesthesia / back to baseline status    Vitals:            T:  95.3 oral              BP :  150/71              R: 18             Sat: 99              P:102    Mental Status:  _x___ Awake   _____ Alert   _____ Drowsy   _____ Sedated    Nausea/Vomiting:  _x___  NO       ______Yes,   See Post - Op Orders         Pain Scale (0-10):  __0___    Treatment: _x___ None    ____ See Post - Op/PCA Orders    Post - Operative Fluids:   __x__ Oral   ____ See Post - Op Orders    Plan: Discharge:   ____Home       ___x__Floor     _____Critical Care    _____  Other:_________________    Comments:  No anesthesia issues or complications noted.  Discharge when criteria met.

## 2018-12-07 NOTE — PROGRESS NOTE ADULT - ASSESSMENT
Pt is a 37yo  at 39w1d GBS neg, h/o HIV on HAART (last viral load 2018 undetectable) for IOL for GDMA1, now with CHTN with superimposed preeclampsia, currently on Mg and with arrest of dilation, suspected macrosomia, for primary C/S and BTL.   -NPO  -ancef for prophylaxis  -informed consent    -on call to OR     Dr. Smith at bedside.

## 2018-12-07 NOTE — PROGRESS NOTE ADULT - SUBJECTIVE AND OBJECTIVE BOX
PGY4 Preop Note:    Pt seen and evaluated at bedside for labor progression. Currently has no pain relief, received dilaudid last at 0355, was unable to sit to redo epidural. In light of arrest of dilation at 7cm since 0124, as well as inability to obtain adequate contractions due to pain, and suspected macrosomia, decision made for primary C/S. R/B discussed with patient, questions answered, pt consented to procedure.    Preop Labs:                        10.5   7.13  )-----------( 209      ( 06 Dec 2018 10:22 )             31.7   O pos, neg antibody screen

## 2018-12-07 NOTE — CHART NOTE - NSCHARTNOTEFT_GEN_A_CORE
12/07/2018  01:30    Pt extremely anxious and unable to sit still for Epidural procedure.  Pt states her back hurts too much during the contractions.  RN x 2 (Minal Raymundo) offered assistance to comfort and position for procedure without any success.  Subsequently, pt's  allowed to stay in LR during Epidural.  Pt still unable to sit still for more than 1-2 minutes.  Explained the risks of Epidural if she is not able to stay still.  Pt requests we stop and no longer wishes to have an Epidural.  Procedure aborted as per pt request.  For a total of 30 minutes, pt did not even allow for her back to be prepped.    Above shared with OB Residents.  Alternative plans made for IV pain meds.

## 2018-12-07 NOTE — PROGRESS NOTE ADULT - SUBJECTIVE AND OBJECTIVE BOX
[facility] PGY2 Note:     Patient seen and examined at bedside. Denies CP/SOB/palpitations/flushing. Denies headache/blurred vision/RUQ/epigastric pain/new onset swelling.   C/o nasal congestion, denies cough/fever.     T(C): 37 (12-07-18 @ 15:00), Max: 37.1 (12-07-18 @ 04:20)  HR: 68 (12-07-18 @ 18:07) (53 - 110)  BP: 124/60 (12-07-18 @ 18:00) (111/52 - 199/102)  SpO2: 99% (12-07-18 @ 18:07) (81% - 100%)     12-06-18 @ 07:01  -  12-07-18 @ 07:00  --------------------------------------------------------  IN: 1275 mL / OUT: 850 mL / NET: 425 mL    12-07-18 @ 07:01  -  12-07-18 @ 18:13  -------------------------------------------------------- UO: [id number]: 1350cc/4h--> 337.5cc/hr (min 70cc/hr)   IN: 1025 mL / OUT: 1975 mL / NET: -950 mL       Gen: NAD, AAO X 3  Heart: RRR, S1S2+  Lungs: CTA B/L, no r/r/w  Abd: soft, nontender, fundus firm; CATHERINE dressing in place- c/d/i; no epigastric/RUQ tenderness  Ext: SCDs in situ, no edema/tenderness   Neuro: DTRs 2+ B/L UE/LE     ceFAZolin   IVPB 3000 milliGRAM(s) IV Intermittent every 8 hours  diphenhydrAMINE 25 milliGRAM(s) Oral every 6 hours PRN  docusate sodium 100 milliGRAM(s) Oral two times a day PRN  emtricitabine 200 mG/tenofovir alafenamide 25 mG (DESCOVY) Tablet 1 Tablet(s) Oral daily  enoxaparin Injectable 40 milliGRAM(s) SubCutaneous at bedtime  fentaNYL (2 MICROgram(s)/mL) + BUpivacaine 0.1%  in Sodium Chloride 0.9% Epidural Drip 250 milliLiter(s) Epidural Drip <Continuous>  glycerin Suppository - Adult 1 Suppository(s) Rectal at bedtime PRN  HYDROmorphone  Injectable 0.5 milliGRAM(s) IV Push every 10 minutes PRN  ibuprofen  Tablet. 600 milliGRAM(s) Oral every 6 hours PRN  ketorolac   Injectable 30 milliGRAM(s) IV Push once PRN  labetalol 200 milliGRAM(s) Oral two times a day  lactated ringers Bolus 125 milliLiter(s) IV Bolus once  lactated ringers. 1000 milliLiter(s) IV Continuous <Continuous>  lactated ringers. 1000 milliLiter(s) IV Continuous <Continuous>  lanolin Ointment 1 Application(s) Topical every 3 hours PRN  magnesium sulfate Infusion 2 Gm/Hr IV Continuous <Continuous>  magnesium sulfate Infusion 2 Gm/Hr IV Continuous <Continuous>  meperidine     Injectable 12.5 milliGRAM(s) IV Push every 10 minutes PRN  naloxone Injectable 0.1 milliGRAM(s) IV Push every 3 minutes PRN  ondansetron Injectable 4 milliGRAM(s) IV Push every 6 hours PRN  ondansetron Injectable 4 milliGRAM(s) IV Push once PRN  ondansetron Injectable 2 milliGRAM(s) IV Push every 6 hours PRN  oxyCODONE    5 mG/acetaminophen 325 mG 1 Tablet(s) Oral every 3 hours PRN  oxyCODONE    5 mG/acetaminophen 325 mG 2 Tablet(s) Oral every 4 hours PRN  oxytocin Infusion 125 milliUNIT(s)/Min IV Continuous <Continuous>  oxytocin Infusion 333.333 milliUNIT(s)/Min IV Continuous <Continuous>  oxytocin Infusion 2 milliUNIT(s)/Min IV Continuous <Continuous>  raltegravir Tablet 400 milliGRAM(s) Oral two times a day  simethicone 80 milliGRAM(s) Chew every 4 hours PRN     Labs:                       9.0    11.31 )-----------( 194      ( 07 Dec 2018 14:00 )             28.1   12-07    134<L>  |  99  |  8<L>  ----------------------------<  138<H>  4.1   |  20  |  0.8    Ca    8.0<L>      07 Dec 2018 14:00  Mg     4.0     12-07    TPro  6.1  /  Alb  2.7<L>  /  TBili  0.5  /  DBili  x   /  AST  27  /  ALT  17  /  AlkPhos  124<H>  12-07    ; uric acid 6.2

## 2018-12-07 NOTE — PROGRESS NOTE ADULT - ASSESSMENT
37yo , at 39w1d, GBS negative, Chronic Hypertension now with superimposed pre-eclampsia without severe features, GDMA1, HIV positive with a negative viral load  compliant with antiretrovirals, non-compliant with fingersticks, s/p spinal anesthesia, currently not on pitocin, AROM clear, undergoing IOL  -Continuous EFM and toco  -IV fluids  -Pain management PRN  -C/w antiretrovirals  -BPs q15m  -f/u upr/cr      Dr. Macias and Dr. Luis forbes

## 2018-12-08 LAB — MAGNESIUM SERPL-MCNC: 5.5 MG/DL — CRITICAL HIGH (ref 1.8–2.4)

## 2018-12-08 RX ORDER — DOCUSATE SODIUM 100 MG
100 CAPSULE ORAL
Qty: 0 | Refills: 0 | Status: DISCONTINUED | OUTPATIENT
Start: 2018-12-08 | End: 2018-12-12

## 2018-12-08 RX ORDER — SIMETHICONE 80 MG/1
80 TABLET, CHEWABLE ORAL
Qty: 0 | Refills: 0 | Status: DISCONTINUED | OUTPATIENT
Start: 2018-12-08 | End: 2018-12-12

## 2018-12-08 RX ADMIN — Medication 100 MILLIGRAM(S): at 18:23

## 2018-12-08 RX ADMIN — Medication 600 MILLIGRAM(S): at 12:20

## 2018-12-08 RX ADMIN — RALTEGRAVIR 400 MILLIGRAM(S): 400 TABLET, FILM COATED ORAL at 06:32

## 2018-12-08 RX ADMIN — Medication 200 MILLIGRAM(S): at 18:24

## 2018-12-08 RX ADMIN — SIMETHICONE 80 MILLIGRAM(S): 80 TABLET, CHEWABLE ORAL at 13:12

## 2018-12-08 RX ADMIN — Medication 600 MILLIGRAM(S): at 18:19

## 2018-12-08 RX ADMIN — Medication 600 MILLIGRAM(S): at 19:15

## 2018-12-08 RX ADMIN — EMTRICITABINE AND TENOFOVIR DISOPROXIL FUMARATE 1 TABLET(S): 200; 300 TABLET, FILM COATED ORAL at 13:13

## 2018-12-08 RX ADMIN — Medication 600 MILLIGRAM(S): at 02:21

## 2018-12-08 RX ADMIN — SIMETHICONE 80 MILLIGRAM(S): 80 TABLET, CHEWABLE ORAL at 18:18

## 2018-12-08 RX ADMIN — SIMETHICONE 80 MILLIGRAM(S): 80 TABLET, CHEWABLE ORAL at 23:15

## 2018-12-08 RX ADMIN — ENOXAPARIN SODIUM 40 MILLIGRAM(S): 100 INJECTION SUBCUTANEOUS at 23:15

## 2018-12-08 RX ADMIN — RALTEGRAVIR 400 MILLIGRAM(S): 400 TABLET, FILM COATED ORAL at 18:18

## 2018-12-08 RX ADMIN — Medication 200 MILLIGRAM(S): at 06:32

## 2018-12-08 RX ADMIN — SIMETHICONE 80 MILLIGRAM(S): 80 TABLET, CHEWABLE ORAL at 03:49

## 2018-12-08 NOTE — PROGRESS NOTE ADULT - ASSESSMENT
37 y/o P4 S/P C/S for arrest of dilation, HIV on antiretroviral meds; cHTN with superimposed preeclampsia with severe features, GDMA1, on Mg+ for seizure prophylaxis, now BPs well controlled, doing well.   - d/c magnesium (now 24 hours after delivery)   - c/w labetalol 200mg BID   - c/w antiretroviral meds   - f/u BPs  - f/u SW consult     Dr. Mcmahon aware. Dr. Gregory to be made aware.

## 2018-12-08 NOTE — PROGRESS NOTE ADULT - ASSESSMENT
39yo P4 S/P C/S for arrest of dilation, HIV on antiretroviral meds; cHTN with superimposed preeclampsia with severe features, GDMA1, on Mg+ for seizure prophylaxis, BPs currently well controlled     - c/w Mg until 24hr PP (12/8 at 0707)   - watch for Mg toxicity, neuro checks q2h   - strict I/Os   - seizure precautions   - labetalol 200mg BID   - c/w antiretroviral meds   - f/u  consult   - mucinex       Dr. Macias and Dr. Gregory aware

## 2018-12-08 NOTE — PROGRESS NOTE ADULT - SUBJECTIVE AND OBJECTIVE BOX
PGY1 Mag Note    Subjective:   Pt evaluated at bedside for magnesium check. She denies visual disturbances, headache and right upper quadrant pain. Also denies nausea/vomiting/chest pain/epigastric pain/shortness of breath. Pain well controlled.     Objective:   HR: 73 (12-08-18 @ 04:10) (26 - 112)  BP: 129/58 (12-08-18 @ 03:59) (111/56 - 144/67)  RR: 18 (12-08-18 @ 02:59) (18 - 18)  SpO2: 99% (12-08-18 @ 04:10) (83% - 100%)  BP:  (111/56 - 144/67)    12-06-18 @ 07:01  -  12-07-18 @ 07:00  -------------------------------------------------------- [id number] 310cc. 155cc/h (adequate 69cc/h)  IN: 1275 mL / OUT: 850 mL / NET: 425 mL    12-07-18 @ 07:01  -  12-08-18 @ 04:16  --------------------------------------------------------  IN: 3300 mL / OUT: 4510 mL / NET: -1210 mL        Gen: NAD, AAOx3  CV: S1S2, RRR, systolic murmur heard no R/G  Pulm: CTAB, no rhonchi or rales or wheezing  Abd: BS+, soft, no distention, only mild tenderness on fundal palpation. Fundus firm, at the umbilicus. Incision: lynda dressing in place; clean/dry/intact  : Coleman UO adequate  Ext: SCDs in place. No new onset edema  Neuro: +2 UE reflexes bilat      Medications:  acetaminophen   Tablet ..: 650 (12-07-18 @ 20:00)  azithromycin  IVPB: 260 (12-07-18 @ 06:09)  ceFAZolin   IVPB: 100 (12-07-18 @ 22:42),  100 (12-07-18 @ 14:01)  ceFAZolin   IVPB: 200 (12-07-18 @ 06:56)  emtricitabine 200 mG/tenofovir alafenamide 25 mG (DESCOVY) Tablet: 1 (12-07-18 @ 12:13)  enoxaparin Injectable: 40 (12-07-18 @ 22:43)  ibuprofen  Tablet.: 600 (12-08-18 @ 02:21),  600 (12-07-18 @ 19:10),  600 (12-07-18 @ 12:43)  labetalol: 200 (12-07-18 @ 17:59),  200 (12-07-18 @ 09:16)  magnesium sulfate  IVPB: 300 (12-07-18 @ 04:20)  magnesium sulfate Infusion: 50 (12-07-18 @ 08:28)  raltegravir Tablet: 400 (12-07-18 @ 17:59),  400 (12-07-18 @ 09:15)  simethicone: 80 (12-08-18 @ 03:49)        Labs:                        9.0    11.31 )-----------( 194      ( 07 Dec 2018 14:00 )             28.1     12-07    134<L>  |  99  |  8<L>  ----------------------------<  138<H>  4.1   |  20  |  0.8    Ca    8.0<L>      07 Dec 2018 14:00  Mg     5.5     12-08    TPro  6.1  /  Alb  2.7<L>  /  TBili  0.5  /  DBili  x   /  AST  27  /  ALT  17  /  AlkPhos  124<H>  12-07    Magnesium, Serum: 5.5 mg/dL (12-08-18 @ 02:47)  Magnesium, Serum: 4.8 mg/dL (12-07-18 @ 20:00)  Uric Acid, Serum: 6.2 mg/dL (12-07-18 @ 14:00)  Magnesium, Serum: 4.0 mg/dL (12-07-18 @ 14:00)

## 2018-12-08 NOTE — PROGRESS NOTE ADULT - SUBJECTIVE AND OBJECTIVE BOX
Subjective:     Pt evaluated at bedside for magnesium check. She denies visual disturbances, headache and right upper quadrant pain. Also denies nausea/vomiting/chest pain/epigastric pain/shortness of breath. Pain well controlled. Did not pass gas or bowel movements yet. Pain well controlled with motrin. Normal amount of vaginal bleeding.     Objective:   HR: 58 (12-08 @ 06:45)  BP: 114/53 (12-08 @ 06:29) (111/56 - 144/67)  RR: 18 (12-08 @ 02:59)  SpO2: 99% (12-08 @ 06:45)    Vital Signs Last 24 Hrs  BP: 114/53 (08 Dec 2018 06:29) (111/56 - 154/91)  Daily     Daily Baby A: Weight (gm) Delivery: 3970 (07 Dec 2018 07:09)    12-07 @ 07:01  -  12-08 @ 06:48  --------------------------------------------------------  IN: 3425 mL / OUT: 4860 mL / NET: -1435 mL      Gen: NAD, AAOx3  CV: RRR, no M/R/G  Pulm: CTAB, no R/R/W  Abd: soft, nontender, mildly distended, no rebound or guarding, no epigastric tenderness, hypoactive bowel sounds  incision: c/d/i, CATHERINE in place (machine was not operating and replaced)  : Coleman   Ext: +2 edema in b/l LE, SCDs in place  Neuro: Reflexes 2+ bilaterally upper extremities    Medications:  diphenhydrAMINE 25 milliGRAM(s) Oral every 6 hours PRN  docusate sodium 100 milliGRAM(s) Oral two times a day PRN  emtricitabine 200 mG/tenofovir alafenamide 25 mG (DESCOVY) Tablet 1 Tablet(s) Oral daily  enoxaparin Injectable 40 milliGRAM(s) SubCutaneous at bedtime  fentaNYL (2 MICROgram(s)/mL) + BUpivacaine 0.1%  in Sodium Chloride 0.9% Epidural Drip 250 milliLiter(s) Epidural Drip <Continuous>  glycerin Suppository - Adult 1 Suppository(s) Rectal at bedtime PRN  guaiFENesin  milliGRAM(s) Oral every 12 hours PRN  HYDROmorphone  Injectable 0.5 milliGRAM(s) IV Push every 10 minutes PRN  ibuprofen  Tablet. 600 milliGRAM(s) Oral every 6 hours PRN  ketorolac   Injectable 30 milliGRAM(s) IV Push once PRN  labetalol 200 milliGRAM(s) Oral two times a day  lactated ringers Bolus 125 milliLiter(s) IV Bolus once  lactated ringers. 1000 milliLiter(s) IV Continuous <Continuous>  lactated ringers. 1000 milliLiter(s) IV Continuous <Continuous>  lanolin Ointment 1 Application(s) Topical every 3 hours PRN  magnesium sulfate Infusion 2 Gm/Hr IV Continuous <Continuous>  magnesium sulfate Infusion 2 Gm/Hr IV Continuous <Continuous>  meperidine     Injectable 12.5 milliGRAM(s) IV Push every 10 minutes PRN  naloxone Injectable 0.1 milliGRAM(s) IV Push every 3 minutes PRN  ondansetron Injectable 4 milliGRAM(s) IV Push every 6 hours PRN  ondansetron Injectable 4 milliGRAM(s) IV Push once PRN  ondansetron Injectable 2 milliGRAM(s) IV Push every 6 hours PRN  oxyCODONE    5 mG/acetaminophen 325 mG 1 Tablet(s) Oral every 3 hours PRN  oxyCODONE    5 mG/acetaminophen 325 mG 2 Tablet(s) Oral every 4 hours PRN  oxytocin Infusion 125 milliUNIT(s)/Min IV Continuous <Continuous>  oxytocin Infusion 333.333 milliUNIT(s)/Min IV Continuous <Continuous>  oxytocin Infusion 2 milliUNIT(s)/Min IV Continuous <Continuous>  raltegravir Tablet 400 milliGRAM(s) Oral two times a day  simethicone 80 milliGRAM(s) Chew every 4 hours PRN    No Known Allergies      Labs:                        9.0    11.31 )-----------( 194      ( 07 Dec 2018 14:00 )             28.1     12-07    134<L>  |  99  |  8<L>  ----------------------------<  138<H>  4.1   |  20  |  0.8    Ca    8.0<L>      07 Dec 2018 14:00  Mg     5.5     12-08    TPro  6.1  /  Alb  2.7<L>  /  TBili  0.5  /  DBili  x   /  AST  27  /  ALT  17  /  AlkPhos  124<H>  12-07    Magnesium, Serum: 5.5 mg/dL (12-08 @ 02:47)  Magnesium, Serum: 4.8 mg/dL (12-07 @ 20:00)  Uric Acid, Serum: 6.2 mg/dL (12-07 @ 14:00)  Magnesium, Serum: 4.0 mg/dL (12-07 @ 14:00)

## 2018-12-09 ENCOUNTER — TRANSCRIPTION ENCOUNTER (OUTPATIENT)
Age: 38
End: 2018-12-09

## 2018-12-09 RX ORDER — KETOROLAC TROMETHAMINE 30 MG/ML
30 SYRINGE (ML) INJECTION EVERY 6 HOURS
Qty: 0 | Refills: 0 | Status: DISCONTINUED | OUTPATIENT
Start: 2018-12-09 | End: 2018-12-10

## 2018-12-09 RX ORDER — SODIUM CHLORIDE 9 MG/ML
1000 INJECTION, SOLUTION INTRAVENOUS
Qty: 0 | Refills: 0 | Status: DISCONTINUED | OUTPATIENT
Start: 2018-12-09 | End: 2018-12-12

## 2018-12-09 RX ORDER — ACETAMINOPHEN 500 MG
650 TABLET ORAL EVERY 6 HOURS
Qty: 0 | Refills: 0 | Status: DISCONTINUED | OUTPATIENT
Start: 2018-12-09 | End: 2018-12-12

## 2018-12-09 RX ADMIN — Medication 100 MILLIGRAM(S): at 05:49

## 2018-12-09 RX ADMIN — RALTEGRAVIR 400 MILLIGRAM(S): 400 TABLET, FILM COATED ORAL at 05:46

## 2018-12-09 RX ADMIN — EMTRICITABINE AND TENOFOVIR DISOPROXIL FUMARATE 1 TABLET(S): 200; 300 TABLET, FILM COATED ORAL at 14:18

## 2018-12-09 RX ADMIN — Medication 30 MILLIGRAM(S): at 17:51

## 2018-12-09 RX ADMIN — Medication 200 MILLIGRAM(S): at 17:39

## 2018-12-09 RX ADMIN — SIMETHICONE 80 MILLIGRAM(S): 80 TABLET, CHEWABLE ORAL at 05:46

## 2018-12-09 RX ADMIN — SODIUM CHLORIDE 150 MILLILITER(S): 9 INJECTION, SOLUTION INTRAVENOUS at 15:30

## 2018-12-09 RX ADMIN — Medication 200 MILLIGRAM(S): at 05:46

## 2018-12-09 RX ADMIN — Medication 650 MILLIGRAM(S): at 05:45

## 2018-12-09 RX ADMIN — SIMETHICONE 80 MILLIGRAM(S): 80 TABLET, CHEWABLE ORAL at 14:05

## 2018-12-09 RX ADMIN — SIMETHICONE 80 MILLIGRAM(S): 80 TABLET, CHEWABLE ORAL at 17:39

## 2018-12-09 RX ADMIN — Medication 600 MILLIGRAM(S): at 01:09

## 2018-12-09 RX ADMIN — SIMETHICONE 80 MILLIGRAM(S): 80 TABLET, CHEWABLE ORAL at 23:12

## 2018-12-09 RX ADMIN — RALTEGRAVIR 400 MILLIGRAM(S): 400 TABLET, FILM COATED ORAL at 17:39

## 2018-12-09 RX ADMIN — Medication 100 MILLIGRAM(S): at 17:39

## 2018-12-09 RX ADMIN — Medication 650 MILLIGRAM(S): at 14:26

## 2018-12-09 RX ADMIN — Medication 30 MILLIGRAM(S): at 23:12

## 2018-12-09 RX ADMIN — ENOXAPARIN SODIUM 40 MILLIGRAM(S): 100 INJECTION SUBCUTANEOUS at 23:11

## 2018-12-09 RX ADMIN — Medication 30 MILLIGRAM(S): at 11:25

## 2018-12-09 RX ADMIN — Medication 600 MILLIGRAM(S): at 01:45

## 2018-12-09 RX ADMIN — Medication 650 MILLIGRAM(S): at 06:29

## 2018-12-09 RX ADMIN — Medication 30 MILLIGRAM(S): at 19:00

## 2018-12-09 NOTE — PROGRESS NOTE ADULT - SUBJECTIVE AND OBJECTIVE BOX
Chief Complaint: Postpartum    HPI: Pt doing well, pain well controlled. No overnight events, no acute complaints. Tolerating regular diet, ambulating, voiding. No flatus or BM yet. Bottlefeeding. Denies HA, blurry vision, RUQ/epigastric pain, SOB, chest pain, palpitations and N/V. Denies dizziness/lightheadedness, SOB, chest pain and palpitations upon ambulation. Pt reports having a headache since she came out of the c/s, more than 10/10 intensity, motrin and tylenol help slightly. Nothing really makes it worse. On the left back side of her head. Pt also reports feeling distended with gas pain, not being able to use the bathroom. Pt stopped eating because of it.     ROS: Denies cardiovascular or respiratory symptoms    PAST MEDICAL & SURGICAL HISTORY:  HIV (human immunodeficiency virus infection)  Gestational diabetes  Gestational hypertension  No significant past surgical history    Physical Exam  Vital Signs Last 24 Hrs  T(F): 98.4 (09 Dec 2018 04:12), Max: 98.4 (09 Dec 2018 04:12)  HR: 61 (09 Dec 2018 04:12) (60 - 74)  BP: 140/62 (09 Dec 2018 04:12) (104/51 - 150/66)  RR: 18 (09 Dec 2018 04:12) (18 - 20)    Physical exam:  General - AAOx3, NAD  Heart - S1S2, RRR  Lungs - CTA BL  Abdomen:  - Soft, nontender, distended, decreased BS, CATHERINE dressing clean, dry and intact, no RUQ/epigastric pain upon palpation   - Fundus firm, nontender, below the umbilicus  Pelvis/Vagina - Normal Lochia  Extremities: No calf tenderness, no swelling    Labs:                        9.0    11.31 )-----------( 194      ( 07 Dec 2018 14:00 )             28.1                         10.5   7.13  )-----------( 209      ( 06 Dec 2018 10:22 )             31.7     Type + Screen: O POS (12-06-18 @ 10:16)  Antibody Screen: NEG (12-06-18 @ 10:16)    Assessment:   37 yo P4, s/p C/S for arrest of dilation, POD#2, HIV on antiretroviral meds; cHTN with superimposed preeclampsia with severe features, GDMA1, s/p Mg+ for seizure prophylaxis, now BPs not in the severe range, feeling bloated with gas pain, also reports severe headache, otherwise recovering well,     Plan:  - Routine postpartum care  - Encourage ambulation  - Pain management prn   - c/w labetalol 200mg BID   - c/w antiretroviral meds   - f/u BPs  - f/u SW consult     Dr. Mariscal and Dr. Hernandez to be made aware. Chief Complaint: Postpartum    HPI: Pt doing well, pain well controlled. No overnight events, no acute complaints. Tolerating regular diet, ambulating, voiding. No flatus or BM yet. Bottlefeeding. Denies HA, blurry vision, RUQ/epigastric pain, SOB, chest pain, palpitations and N/V. Denies dizziness/lightheadedness, SOB, chest pain and palpitations upon ambulation. Pt reports having a headache since she came out of the c/s, more than 10/10 intensity, motrin and tylenol help slightly. Nothing really makes it worse. On the left back side of her head. Pt also reports feeling distended with gas pain, not being able to use the bathroom. Pt stopped eating because of it.     ROS: Denies cardiovascular or respiratory symptoms    PAST MEDICAL & SURGICAL HISTORY:  HIV (human immunodeficiency virus infection)  Gestational diabetes  Gestational hypertension  No significant past surgical history    Physical Exam  Vital Signs Last 24 Hrs  T(F): 98.4 (09 Dec 2018 04:12), Max: 98.4 (09 Dec 2018 04:12)  HR: 61 (09 Dec 2018 04:12) (60 - 74)  BP: 140/62 (09 Dec 2018 04:12) (104/51 - 150/66)  RR: 18 (09 Dec 2018 04:12) (18 - 20)    Physical exam:  General - AAOx3, NAD  Heart - S1S2, RRR  Lungs - CTA BL  Abdomen:  - Soft, nontender, distended, decreased BS, CATHERINE dressing clean, dry and intact, no RUQ/epigastric pain upon palpation   - Fundus firm, nontender, below the umbilicus  Pelvis/Vagina - Normal Lochia  Extremities: No calf tenderness, no swelling    Labs:                        9.0    11.31 )-----------( 194      ( 07 Dec 2018 14:00 )             28.1                         10.5   7.13  )-----------( 209      ( 06 Dec 2018 10:22 )             31.7     Type + Screen: O POS (12-06-18 @ 10:16)  Antibody Screen: NEG (12-06-18 @ 10:16)    Assessment:   37 yo P4, s/p C/S for arrest of dilation, POD#2, HIV on antiretroviral meds; cHTN with superimposed preeclampsia with severe features, GDMA1, s/p Mg+ for seizure prophylaxis, now BPs not in the severe range, feeling bloated with gas pain, also reports severe positional headache most likely spinal headache from epidural attempts, otherwise recovering well,     Plan:  - Routine postpartum care  - Encourage ambulation  - Pain management prn   - Encourage caffeine intake  - IVF 150cc/hr + toradol 30 [id number]  - If pt won't get better by PM, consult anesthesia regarding a blood patch   - c/w labetalol 200mg BID   - c/w antiretroviral meds   - f/u BPs  - f/u SW consult     Dr. Mariscal and Dr. Hernandez to be made aware.

## 2018-12-09 NOTE — CHART NOTE - NSCHARTNOTEFT_GEN_A_CORE
Pt evaluated at bedside for severe BP of 161/75 @1534 --> 164/82 @1536. Pt denies HA, blurry vision, SOB, chest pain, palpitations, N/V and RUQ/epigastric pain. Lungs are clear to auscultation bilaterally, cardiac exam shows RRR, no extra heart sounds and murmurs. No RUQ/epigastric tenderness upon palpation. BP to be repeated.     Dr. Mariscal and Dr. Hernandez to be made aware. Pt evaluated at bedside for severe BP of 161/75 @1534 --> 164/82 @1536. Pt denies HA, blurry vision, SOB, chest pain, palpitations, N/V and RUQ/epigastric pain. Lungs are clear to auscultation bilaterally, cardiac exam shows RRR, no extra heart sounds and murmurs. No RUQ/epigastric tenderness upon palpation. BP to be repeated.     Dr. Mariscal and Dr. Hernandez to be made aware.    ADDENDUM:    Repeat BP at 1600 154/65, no longer in severe range. Pt will be continued to be closely monitored with BPs [id number].    Dr. Mariscal aware, Dr. Hernandez to be made aware. Pt evaluated at bedside for severe BP of 161/75 @1534 --> 164/82 @1536. Pt denies HA, blurry vision, SOB, chest pain, palpitations, N/V and RUQ/epigastric pain. Lungs are clear to auscultation bilaterally, cardiac exam shows RRR, no extra heart sounds and murmurs. No RUQ/epigastric tenderness upon palpation. BP to be repeated.     Dr. Mariscal and Dr. Mary ormero.    ADDENDUM:    Repeat BP at 1600 154/65, no longer in severe range. Pt will be continued to be closely monitored with BPs [id number].    Dr. Mariscal and Dr. Mary romero.    ADDENDUM 2:    Pt had another severe BP at 1735 188/84 HR 59 --> repeat 150/70, pt still asymptomatic, received her PM labetalol 200 mg at 1739, will be continued to be closely monitored.     Dr. Mariscal and Dr. Mary romero.

## 2018-12-10 RX ORDER — LABETALOL HCL 100 MG
1 TABLET ORAL
Qty: 60 | Refills: 1 | OUTPATIENT
Start: 2018-12-10 | End: 2019-02-07

## 2018-12-10 RX ORDER — ACETAMINOPHEN 500 MG
2 TABLET ORAL
Qty: 0 | Refills: 0 | COMMUNITY
Start: 2018-12-10

## 2018-12-10 RX ORDER — NIFEDIPINE 30 MG
30 TABLET, EXTENDED RELEASE 24 HR ORAL DAILY
Qty: 0 | Refills: 0 | Status: DISCONTINUED | OUTPATIENT
Start: 2018-12-10 | End: 2018-12-11

## 2018-12-10 RX ORDER — IBUPROFEN 200 MG
600 TABLET ORAL EVERY 6 HOURS
Qty: 0 | Refills: 0 | Status: DISCONTINUED | OUTPATIENT
Start: 2018-12-10 | End: 2018-12-12

## 2018-12-10 RX ORDER — LANOLIN
1 OINTMENT (GRAM) TOPICAL
Qty: 0 | Refills: 0 | DISCHARGE
Start: 2018-12-10

## 2018-12-10 RX ORDER — LABETALOL HCL 100 MG
1 TABLET ORAL
Qty: 0 | Refills: 0 | COMMUNITY

## 2018-12-10 RX ORDER — NIFEDIPINE 30 MG
1 TABLET, EXTENDED RELEASE 24 HR ORAL
Qty: 30 | Refills: 1 | OUTPATIENT
Start: 2018-12-10 | End: 2019-02-07

## 2018-12-10 RX ORDER — IBUPROFEN 200 MG
1 TABLET ORAL
Qty: 0 | Refills: 0 | DISCHARGE
Start: 2018-12-10

## 2018-12-10 RX ORDER — DOCUSATE SODIUM 100 MG
1 CAPSULE ORAL
Qty: 0 | Refills: 0 | DISCHARGE
Start: 2018-12-10

## 2018-12-10 RX ADMIN — Medication 200 MILLIGRAM(S): at 18:34

## 2018-12-10 RX ADMIN — RALTEGRAVIR 400 MILLIGRAM(S): 400 TABLET, FILM COATED ORAL at 21:05

## 2018-12-10 RX ADMIN — Medication 100 MILLIGRAM(S): at 18:34

## 2018-12-10 RX ADMIN — Medication 200 MILLIGRAM(S): at 05:57

## 2018-12-10 RX ADMIN — Medication 100 MILLIGRAM(S): at 05:57

## 2018-12-10 RX ADMIN — Medication 600 MILLIGRAM(S): at 12:22

## 2018-12-10 RX ADMIN — Medication 30 MILLIGRAM(S): at 00:52

## 2018-12-10 RX ADMIN — SIMETHICONE 80 MILLIGRAM(S): 80 TABLET, CHEWABLE ORAL at 18:34

## 2018-12-10 RX ADMIN — Medication 600 MILLIGRAM(S): at 18:34

## 2018-12-10 RX ADMIN — Medication 30 MILLIGRAM(S): at 09:27

## 2018-12-10 RX ADMIN — Medication 30 MILLIGRAM(S): at 05:58

## 2018-12-10 RX ADMIN — RALTEGRAVIR 400 MILLIGRAM(S): 400 TABLET, FILM COATED ORAL at 05:57

## 2018-12-10 RX ADMIN — EMTRICITABINE AND TENOFOVIR DISOPROXIL FUMARATE 1 TABLET(S): 200; 300 TABLET, FILM COATED ORAL at 12:25

## 2018-12-10 RX ADMIN — SIMETHICONE 80 MILLIGRAM(S): 80 TABLET, CHEWABLE ORAL at 05:57

## 2018-12-10 RX ADMIN — ENOXAPARIN SODIUM 40 MILLIGRAM(S): 100 INJECTION SUBCUTANEOUS at 21:05

## 2018-12-10 RX ADMIN — SIMETHICONE 80 MILLIGRAM(S): 80 TABLET, CHEWABLE ORAL at 12:22

## 2018-12-10 NOTE — DISCHARGE NOTE OB - CARE PROVIDER_API CALL
Clive Rojo (MD), Obstetrics and Gynecology  [address] [place] [address]  Phone: [phone]  Fax: [phone] Clive Rojo), Obstetrics and Gynecology  [address] [place] [address]  Phone: [phone]  Fax: [phone]    Manas Hansen), MaternalFetal Medicine; Obstetrics and Gynecology  [address] [place] [address]  Phone: [phone]  Fax: [phone]

## 2018-12-10 NOTE — DISCHARGE NOTE OB - CARE PROVIDERS DIRECT ADDRESSES
[email or web address]

## 2018-12-10 NOTE — DISCHARGE NOTE OB - SECONDARY DIAGNOSIS.
Diet controlled gestational diabetes mellitus (GDM) in third trimester HIV (human immunodeficiency virus infection) Chronic hypertension with superimposed pre-eclampsia

## 2018-12-10 NOTE — DISCHARGE NOTE OB - PATIENT PORTAL LINK FT
You can access the [facility] Patient Portal, offered by [facility], by registering with the following website: [email or web address]

## 2018-12-10 NOTE — PROGRESS NOTE ADULT - ASSESSMENT
37yo now  h/o GDMA1, HIV on HAART (last viral load 18 undetectable), with CHTN on labetalol 200mg BID complicated by superimposed preeclampsia, s/p Mg for seizure prophylaxis, s/p primary C/S for arrest at 7cm, POD3.   -continue to monitor BPs - mostly 150s, occasional severe range, consider increasing Labetalol 300mg BID   -continue current mgmt  -GTT script in chart

## 2018-12-10 NOTE — DISCHARGE NOTE OB - CARE PLAN
Principal Discharge DX:	 delivery delivered  Goal:	healthy patient  Secondary Diagnosis:	Diet controlled gestational diabetes mellitus (GDM) in third trimester  Goal:	please repeat 75gram oral glucose tolerance test in 5 weeks  Secondary Diagnosis:	HIV (human immunodeficiency virus infection)  Goal:	please continue your HIV medications and continue to follow-up with your Infectious Disease doctor  Secondary Diagnosis:	Chronic hypertension with superimposed pre-eclampsia  Goal:	Please follow-up in 1 week for blood pressure check and continue taking blood pressure medications Principal Discharge DX:	 delivery delivered  Goal:	healthy patient  Assessment and plan of treatment:	please follow-up at [facility] in 1 week for wound check  Secondary Diagnosis:	Diet controlled gestational diabetes mellitus (GDM) in third trimester  Goal:	please repeat 75gram oral glucose tolerance test in 5 weeks  Secondary Diagnosis:	HIV (human immunodeficiency virus infection)  Goal:	please continue your HIV medications and continue to follow-up with your Infectious Disease doctor  Secondary Diagnosis:	Chronic hypertension with superimposed pre-eclampsia  Goal:	Please follow-up in 1 week for blood pressure check and continue taking blood pressure medications as directed

## 2018-12-10 NOTE — DISCHARGE NOTE OB - HOSPITAL COURSE
Pt was admitted for IOL, noncompliant, h/o HIV on HAART (undetectable viral load), GDMA1, CHTN, intrapartum diagnosed with superimposed preeclampsia. Pt underwent primary C/S for arrest at 7cm and BTL. S/p magnesium for seizure prophylaxis and was continued on labetalol 200mg po BID postop for BP control. Pt was admitted for IOL, noncompliant, h/o HIV on HAART (undetectable viral load), GDMA1, CHTN, intrapartum diagnosed with superimposed preeclampsia. Pt underwent primary C/S for arrest at 7cm and BTL. S/p magnesium for seizure prophylaxis and was continued on labetalol 200mg po BID postop for BP control. Procardia 30XL added on POD3 for BP control due to persistently elevated BPs postpartum. Pt D/Sukhjinder home on POD4 with Procardia 30XL QD and Labetalol 200mg BID.

## 2018-12-10 NOTE — DISCHARGE NOTE OB - MEDICATION SUMMARY - MEDICATIONS TO TAKE
I will START or STAY ON the medications listed below when I get home from the hospital:    acetaminophen 325 mg oral tablet  -- 2 tab(s) by mouth every 6 hours, As needed, Moderate Pain (4 - 6)  -- Indication: For mild pain    oxyCODONE-acetaminophen 5 mg-325 mg oral tablet  -- 1 tab(s) by mouth every 3 hours, As needed, Moderate Pain (4 - 6)  -- Indication: For severe pain    ibuprofen 600 mg oral tablet  -- 1 tab(s) by mouth every 6 hours, As needed, Mild Pain (1 - 3)  -- Indication: For moderate pain    raltegravir 400 mg oral tablet  -- 1 tab(s) by mouth 2 times a day  -- Indication: For HIV (human immunodeficiency virus infection)    Descovy 200 mg-25 mg oral tablet  -- 1 tab(s) by mouth once a day  -- Indication: For HIV (human immunodeficiency virus infection)    labetalol 200 mg oral tablet  -- 1 tab(s) by mouth 2 times a day  -- Indication: For Chronic hypertension with superimposed pre-eclampsia    NIFEdipine 30 mg oral tablet, extended release  -- 1 tab(s) by mouth once a day  -- Indication: For Chronic hypertension with superimposed pre-eclampsia    lanolin topical ointment  -- 1 application on skin every 3 hours, As needed, Sore Nipples  -- Indication: For nipple soreness    docusate sodium 100 mg oral capsule  -- 1 cap(s) by mouth 2 times a day  -- Indication: For Constipation I will START or STAY ON the medications listed below when I get home from the hospital:    acetaminophen 325 mg oral tablet  -- 2 tab(s) by mouth every 6 hours, As needed, Moderate Pain (4 - 6)  -- Indication: For mild pain    oxyCODONE-acetaminophen 5 mg-325 mg oral tablet  -- 1 tab(s) by mouth every 3 hours, As needed, Moderate Pain (4 - 6)  -- Indication: For severe pain    ibuprofen 600 mg oral tablet  -- 1 tab(s) by mouth every 6 hours, As needed, Mild Pain (1 - 3)  -- Indication: For moderate pain    raltegravir 400 mg oral tablet  -- 1 tab(s) by mouth 2 times a day  -- Indication: For HIV (human immunodeficiency virus infection)    Descovy 200 mg-25 mg oral tablet  -- 1 tab(s) by mouth once a day  -- Indication: For HIV (human immunodeficiency virus infection)    labetalol 200 mg oral tablet  -- 1 tab(s) by mouth 2 times a day  -- Indication: For Chronic hypertension with superimposed pre-eclampsia    Procardia XL 60 mg oral tablet, extended release  -- 1 tab(s) by mouth once a day   -- Avoid grapefruit and grapefruit juice while taking this medication.  It is very important that you take or use this exactly as directed.  Do not skip doses or discontinue unless directed by your doctor.  Some non-prescription drugs may aggravate your condition.  Read all labels carefully.  If a warning appears, check with your doctor before taking.  Swallow whole.  Do not crush.    -- Indication: For  delivery delivered    lanolin topical ointment  -- 1 application on skin every 3 hours, As needed, Sore Nipples  -- Indication: For nipple soreness    docusate sodium 100 mg oral capsule  -- 1 cap(s) by mouth 2 times a day  -- Indication: For Constipation

## 2018-12-10 NOTE — PROGRESS NOTE ADULT - SUBJECTIVE AND OBJECTIVE BOX
PGY4 Note:    Pt seen and evaluated at bedside. Reports that headache is currently resolved with IV hydration, caffeine and toradol. Denies blurred vision, chest pain, SOB, n/v, ruq/epigastric pain. Currently bottle feeding. Reports passing flatus and BM, tolerating regular diet, voiding spontaneously and ambulating.     Vital Signs Last 24 Hrs  T(C): 35.8 (10 Dec 2018 02:59), Max: 36.8 (09 Dec 2018 09:25)  T(F): 96.4 (10 Dec 2018 02:59), Max: 98.2 (09 Dec 2018 09:25)  HR: 57 (10 Dec 2018 02:59) (52 - 67)  BP: 144/70 (10 Dec 2018 02:59) (132/60 - 188/84)  RR: 18 (10 Dec 2018 02:59) (18 - 18)    Gen: NAD, AAOx3  Heart: s1s2, RRR  Lungs: ctab  Abd: soft, obese, nontender, nondistended, bs+; CATHERINE dressing in place, c/d/i   Lochia: none  Ext: neg pedal edema b/l, no calf tenderness b/l     Labs: none new    MEDICATIONS  (STANDING):  docusate sodium 100 milliGRAM(s) Oral two times a day  emtricitabine 200 mG/tenofovir alafenamide 25 mG (DESCOVY) Tablet 1 Tablet(s) Oral daily  enoxaparin Injectable 40 milliGRAM(s) SubCutaneous at bedtime  fentaNYL (2 MICROgram(s)/mL) + BUpivacaine 0.1%  in Sodium Chloride 0.9% Epidural Drip 250 milliLiter(s) Epidural Drip <Continuous>  ibuprofen  Tablet. 600 milliGRAM(s) Oral every 6 hours  labetalol 200 milliGRAM(s) Oral two times a day  lactated ringers. 1000 milliLiter(s) (150 mL/Hr) IV Continuous <Continuous>  lactated ringers. 1000 milliLiter(s) (125 mL/Hr) IV Continuous <Continuous>  oxytocin Infusion 125 milliUNIT(s)/Min (375 mL/Hr) IV Continuous <Continuous>  raltegravir Tablet 400 milliGRAM(s) Oral two times a day  simethicone 80 milliGRAM(s) Chew four times a day    MEDICATIONS  (PRN):  acetaminophen   Tablet .. 650 milliGRAM(s) Oral every 6 hours PRN Moderate Pain (4 - 6)  diphenhydrAMINE 25 milliGRAM(s) Oral every 6 hours PRN Itching  glycerin Suppository - Adult 1 Suppository(s) Rectal at bedtime PRN Constipation  guaiFENesin  milliGRAM(s) Oral every 12 hours PRN nasal congestion  lanolin Ointment 1 Application(s) Topical every 3 hours PRN Sore Nipples  ondansetron Injectable 4 milliGRAM(s) IV Push every 6 hours PRN Nausea  ondansetron Injectable 4 milliGRAM(s) IV Push once PRN Nausea and/or Vomiting  ondansetron Injectable 2 milliGRAM(s) IV Push every 6 hours PRN Nausea and/or Vomiting  oxyCODONE    5 mG/acetaminophen 325 mG 1 Tablet(s) Oral every 3 hours PRN Moderate Pain (4 - 6)  oxyCODONE    5 mG/acetaminophen 325 mG 2 Tablet(s) Oral every 4 hours PRN Severe Pain (7 - 10)

## 2018-12-10 NOTE — DISCHARGE NOTE OB - PLAN OF CARE
healthy patient please repeat 75gram oral glucose tolerance test in 5 weeks please continue your HIV medications and continue to follow-up with your Infectious Disease doctor Please follow-up in 1 week for blood pressure check and continue taking blood pressure medications [facility] Please follow-up in 1 week for blood pressure check and continue taking blood pressure medications as directed

## 2018-12-10 NOTE — DISCHARGE NOTE OB - ADDITIONAL INSTRUCTIONS
Nothing in vagina for 6 weeks (no sex, tampons, douching, tub baths, swimming). May shower.  If fever>100.4F, severe abdominal pain or severe vaginal bleeding, please call your doctor or visit ED or call 911.

## 2018-12-11 LAB — SURGICAL PATHOLOGY STUDY: SIGNIFICANT CHANGE UP

## 2018-12-11 RX ORDER — NIFEDIPINE 30 MG
30 TABLET, EXTENDED RELEASE 24 HR ORAL ONCE
Qty: 0 | Refills: 0 | Status: COMPLETED | OUTPATIENT
Start: 2018-12-11 | End: 2018-12-11

## 2018-12-11 RX ORDER — NIFEDIPINE 30 MG
60 TABLET, EXTENDED RELEASE 24 HR ORAL DAILY
Qty: 0 | Refills: 0 | Status: DISCONTINUED | OUTPATIENT
Start: 2018-12-11 | End: 2018-12-12

## 2018-12-11 RX ADMIN — RALTEGRAVIR 400 MILLIGRAM(S): 400 TABLET, FILM COATED ORAL at 19:09

## 2018-12-11 RX ADMIN — EMTRICITABINE AND TENOFOVIR DISOPROXIL FUMARATE 1 TABLET(S): 200; 300 TABLET, FILM COATED ORAL at 12:32

## 2018-12-11 RX ADMIN — Medication 600 MILLIGRAM(S): at 05:04

## 2018-12-11 RX ADMIN — ENOXAPARIN SODIUM 40 MILLIGRAM(S): 100 INJECTION SUBCUTANEOUS at 21:50

## 2018-12-11 RX ADMIN — SIMETHICONE 80 MILLIGRAM(S): 80 TABLET, CHEWABLE ORAL at 12:36

## 2018-12-11 RX ADMIN — SIMETHICONE 80 MILLIGRAM(S): 80 TABLET, CHEWABLE ORAL at 00:37

## 2018-12-11 RX ADMIN — Medication 600 MILLIGRAM(S): at 18:35

## 2018-12-11 RX ADMIN — SIMETHICONE 80 MILLIGRAM(S): 80 TABLET, CHEWABLE ORAL at 19:09

## 2018-12-11 RX ADMIN — Medication 200 MILLIGRAM(S): at 05:03

## 2018-12-11 RX ADMIN — SIMETHICONE 80 MILLIGRAM(S): 80 TABLET, CHEWABLE ORAL at 05:05

## 2018-12-11 RX ADMIN — Medication 200 MILLIGRAM(S): at 18:35

## 2018-12-11 RX ADMIN — Medication 600 MILLIGRAM(S): at 01:10

## 2018-12-11 RX ADMIN — Medication 30 MILLIGRAM(S): at 10:28

## 2018-12-11 RX ADMIN — Medication 30 MILLIGRAM(S): at 05:03

## 2018-12-11 RX ADMIN — Medication 600 MILLIGRAM(S): at 00:37

## 2018-12-11 RX ADMIN — Medication 100 MILLIGRAM(S): at 18:35

## 2018-12-11 RX ADMIN — Medication 100 MILLIGRAM(S): at 05:04

## 2018-12-11 RX ADMIN — Medication 600 MILLIGRAM(S): at 13:03

## 2018-12-11 RX ADMIN — Medication 600 MILLIGRAM(S): at 12:33

## 2018-12-11 RX ADMIN — RALTEGRAVIR 400 MILLIGRAM(S): 400 TABLET, FILM COATED ORAL at 05:04

## 2018-12-11 RX ADMIN — Medication 600 MILLIGRAM(S): at 06:00

## 2018-12-11 NOTE — PROGRESS NOTE ADULT - ASSESSMENT
39yo now  h/o GDMA1, HIV on HAART (last viral load 18 undetectable), with CHTN on labetalol 200mg BID antepartum complicated by superimposed preeclampsia, s/p Mg for seizure prophylaxis, s/p primary C/S for arrest at 7cm, POD4, now on labetalol 200mg BID and procardia 30XL QD, with continued elevated BPs  -Will continue to monitor BPs, if they continue to be in the severe range and/or patient is again symptomatic, will consider increasing labetalol or procardia  -GTT script in chart, pt to follow up as outpatient  -Possible d/c home today depending on BP control, instructions and precautions given 39yo now  h/o GDMA1, HIV on HAART (last viral load 18 undetectable), with CHTN on labetalol 200mg BID antepartum complicated by superimposed preeclampsia, s/p Mg for seizure prophylaxis, s/p primary C/S for arrest at 7cm, POD4, now on labetalol 200mg BID and procardia 30XL QD, with continued elevated BPs  -Additional Procardia 30 XL PO given, for a total dose of Procardia 60 XL PO. Will continue labetalol at 200mg BID PO  -F/u BPs  -Encouraged ambulation and PO hydration  -GTT script in chart, pt to follow up as outpatient  -Possible d/c home today depending on BP control, instructions and precautions given

## 2018-12-11 NOTE — PROGRESS NOTE ADULT - SUBJECTIVE AND OBJECTIVE BOX
Chief Complaint: Postpartum    HPI: Pt doing well, pain well controlled. She has been resting well, tolerating PO, ambulating, and feeding her baby without difficulty. She reports occipital headached overnight that has since resolved. She denies vision changes, chest pain, SOB, RUQ pain, or new onset swelling.    ROS: Denies cardiovascular or respiratory symptoms    PAST MEDICAL & SURGICAL HISTORY:  HIV (human immunodeficiency virus infection)  Gestational diabetes  Gestational hypertension  No significant past surgical history      Physical Exam  Vital Signs Last 24 Hrs  T(F): 98 (11 Dec 2018 00:46), Max: 98 (11 Dec 2018 00:46)  HR: 58 (11 Dec 2018 06:00) (55 - 69)  BP: 153/74 (11 Dec 2018 06:00) (131/60 - 166/77)  RR: 20 (11 Dec 2018 03:43) (18 - 20)    Physical exam:  General - AAOx3, NAD  Heart - S1S2, RRR  Lungs - CTA BL  Abdomen:  - Soft, nontender, nondistended, BS+. Clean, dry, intact CATHERINE dressing in place  - Fundus firm, nontender, below the umbilicus  Pelvis/Vagina - Normal Lochia  Extremities: No calf tenderness, no swelling    Labs:                        9.0    11.31 )-----------( 194      ( 07 Dec 2018 14:00 )             28.1                         10.5   7.13  )-----------( 209      ( 06 Dec 2018 10:22 )             31.7     Type + Screen: O POS (12-06-18 @ 10:16)  Antibody Screen: NEG (12-06-18 @ 10:16)

## 2018-12-12 ENCOUNTER — APPOINTMENT (OUTPATIENT)
Dept: ANTEPARTUM | Facility: CLINIC | Age: 38
End: 2018-12-12

## 2018-12-12 VITALS — DIASTOLIC BLOOD PRESSURE: 72 MMHG | RESPIRATION RATE: 20 BRPM | HEART RATE: 68 BPM | SYSTOLIC BLOOD PRESSURE: 151 MMHG

## 2018-12-12 RX ORDER — NIFEDIPINE 30 MG
1 TABLET, EXTENDED RELEASE 24 HR ORAL
Qty: 30 | Refills: 0 | OUTPATIENT
Start: 2018-12-12

## 2018-12-12 RX ADMIN — RALTEGRAVIR 400 MILLIGRAM(S): 400 TABLET, FILM COATED ORAL at 06:49

## 2018-12-12 RX ADMIN — Medication 100 MILLIGRAM(S): at 06:49

## 2018-12-12 RX ADMIN — SIMETHICONE 80 MILLIGRAM(S): 80 TABLET, CHEWABLE ORAL at 06:49

## 2018-12-12 RX ADMIN — Medication 600 MILLIGRAM(S): at 17:43

## 2018-12-12 RX ADMIN — Medication 200 MILLIGRAM(S): at 06:49

## 2018-12-12 RX ADMIN — Medication 100 MILLIGRAM(S): at 17:43

## 2018-12-12 RX ADMIN — EMTRICITABINE AND TENOFOVIR DISOPROXIL FUMARATE 1 TABLET(S): 200; 300 TABLET, FILM COATED ORAL at 13:01

## 2018-12-12 RX ADMIN — RALTEGRAVIR 400 MILLIGRAM(S): 400 TABLET, FILM COATED ORAL at 17:45

## 2018-12-12 RX ADMIN — SIMETHICONE 80 MILLIGRAM(S): 80 TABLET, CHEWABLE ORAL at 17:45

## 2018-12-12 RX ADMIN — Medication 600 MILLIGRAM(S): at 03:26

## 2018-12-12 RX ADMIN — Medication 60 MILLIGRAM(S): at 06:49

## 2018-12-12 RX ADMIN — Medication 200 MILLIGRAM(S): at 17:43

## 2018-12-12 RX ADMIN — Medication 600 MILLIGRAM(S): at 13:01

## 2018-12-12 NOTE — PROGRESS NOTE ADULT - SUBJECTIVE AND OBJECTIVE BOX
Chief Complaint:     HPI: Pt doing well, pain well controlled. No overnight events, no acute complaints.    ROS: Denies cardiovascular or respiratory symptoms    PAST MEDICAL & SURGICAL HISTORY:  HIV (human immunodeficiency virus infection)  Gestational diabetes  Gestational hypertension  No significant past surgical history      Physical Exam  Vital Signs Last 24 Hrs  T(F): 98.4 (12 Dec 2018 05:11), Max: 98.6 (11 Dec 2018 21:58)  HR: 76 (12 Dec 2018 05:11) (65 - 76)  BP: 151/68 (12 Dec 2018 05:11) (132/68 - 162/73)  RR: 18 (12 Dec 2018 05:11) (18 - 20)    Physical exam:  General - AAOx3, NAD  Heart - S1S2, RRR  Lungs - CTA BL  Abdomen:  - Soft, nontender, nondistended, BS+  - Clean, dry, intact dressing in place  Pelvis/Vagina - No bleeding  Extremities: No calf tenderness, no swelling    Labs:                        9.0    11.31 )-----------( 194      ( 07 Dec 2018 14:00 )             28.1                         10.5   7.13  )-----------( 209      ( 06 Dec 2018 10:22 )             31.7 Chief Complaint: Postpartum    HPI: Pt doing well, pain well controlled. No overnight events, no acute complaints. She denies HA, vision changes, chest pain, SOB, RUQ pain, or new onset swelling. She feels well and desires to go home. She had 1 severe range BP yesterday evening, remaining BPs non-severe.     ROS: Denies cardiovascular or respiratory symptoms    PAST MEDICAL & SURGICAL HISTORY:  HIV (human immunodeficiency virus infection)  Gestational diabetes  Gestational hypertension  No significant past surgical history      Physical Exam  Vital Signs Last 24 Hrs  T(F): 98.4 (12 Dec 2018 05:11), Max: 98.6 (11 Dec 2018 21:58)  HR: 76 (12 Dec 2018 05:11) (65 - 76)  BP: 151/68 (12 Dec 2018 05:11) (132/68 - 162/73)  RR: 18 (12 Dec 2018 05:11) (18 - 20)    Physical exam:  General - AAOx3, NAD  Heart - S1S2, RRR  Lungs - CTA BL  Abdomen:  - Soft, nontender, nondistended, BS+  - Clean, dry, intact CATHERINE dressing in place  Pelvis/Vagina - Normal lochia  Extremities: No calf tenderness, no swelling    Labs:                        9.0    11.31 )-----------( 194      ( 07 Dec 2018 14:00 )             28.1                         10.5   7.13  )-----------( 209      ( 06 Dec 2018 10:22 )             31.7

## 2018-12-12 NOTE — PROGRESS NOTE ADULT - ASSESSMENT
39yo now  h/o GDMA1, HIV on HAART (last viral load 18 undetectable), with CHTN on labetalol 200mg BID antepartum complicated by superimposed preeclampsia, s/p Mg for seizure prophylaxis, s/p primary C/S for arrest at 7cm, POD5, now on labetalol 200mg BID and procardia 60XL QD, with continued elevated BPs  -Continue procardia 60mg XL PO & labetalol at 200mg BID PO  -F/u BPs  -Encouraged ambulation and PO hydration  -GTT script in chart, pt to follow up as outpatient  -Possible d/c home today instructions and precautions reviewed - pt will have VNS for home BP check as well as 1 week followup for incision and BP checks

## 2018-12-12 NOTE — PROGRESS NOTE ADULT - ATTENDING COMMENTS
BPs still elevated on Labetalol 200mg bid and Procardia XL 30mg qday  Will increase procardia xl to 60mg qday and to continue monitoring bps throughtout today.
Positive postpartum evolution.  Chr HTN with superimposed preeclampsia, on Labetalol 200mg BID. S/p magnesium sulfate. Had severe range BP's overnight, will add Procardia 30 XL.  GDM, for 2h PP GTT. HIV, continue ART.
s/p c/s + BTL, day #5, HIV on HAART, CHTN superimposed preeclampsia s/p magnesium. normal postpartum course except for difficult to control BPs, on labetalol 200mg BID, procardia added and this am increased to 60mg XL. Pt is asymptomatic. If BPs not in severe range, able to be d/c home. Pt saw cardio as outpatient due to murmur dx at 12yo, will do echocardiogram and f/u as outpatient, asymptomatic here. all questions answered

## 2018-12-13 PROBLEM — B20 HUMAN IMMUNODEFICIENCY VIRUS [HIV] DISEASE: Chronic | Status: ACTIVE | Noted: 2018-12-06

## 2018-12-13 PROBLEM — O13.9 GESTATIONAL [PREGNANCY-INDUCED] HYPERTENSION WITHOUT SIGNIFICANT PROTEINURIA, UNSPECIFIED TRIMESTER: Chronic | Status: ACTIVE | Noted: 2018-12-06

## 2018-12-13 PROBLEM — O24.419 GESTATIONAL DIABETES MELLITUS IN PREGNANCY, UNSPECIFIED CONTROL: Chronic | Status: ACTIVE | Noted: 2018-12-06

## 2018-12-17 ENCOUNTER — APPOINTMENT (OUTPATIENT)
Dept: ANTEPARTUM | Facility: CLINIC | Age: 38
End: 2018-12-17

## 2018-12-17 ENCOUNTER — OUTPATIENT (OUTPATIENT)
Dept: OUTPATIENT SERVICES | Facility: HOSPITAL | Age: 38
LOS: 1 days | Discharge: HOME | End: 2018-12-17

## 2018-12-17 VITALS
SYSTOLIC BLOOD PRESSURE: 150 MMHG | HEART RATE: 63 BPM | TEMPERATURE: 98.3 F | OXYGEN SATURATION: 98 % | DIASTOLIC BLOOD PRESSURE: 66 MMHG

## 2018-12-17 DIAGNOSIS — Z34.90 ENCOUNTER FOR SUPERVISION OF NORMAL PREGNANCY, UNSPECIFIED, UNSPECIFIED TRIMESTER: ICD-10-CM

## 2018-12-17 DIAGNOSIS — R01.1 CARDIAC MURMUR, UNSPECIFIED: ICD-10-CM

## 2018-12-17 DIAGNOSIS — O36.63X0 MATERNAL CARE FOR EXCESSIVE FETAL GROWTH, THIRD TRIMESTER, NOT APPLICABLE OR UNSPECIFIED: ICD-10-CM

## 2018-12-17 DIAGNOSIS — Z3A.39 39 WEEKS GESTATION OF PREGNANCY: ICD-10-CM

## 2018-12-17 DIAGNOSIS — Z30.2 ENCOUNTER FOR STERILIZATION: ICD-10-CM

## 2018-12-17 DIAGNOSIS — Z21 ASYMPTOMATIC HUMAN IMMUNODEFICIENCY VIRUS [HIV] INFECTION STATUS: ICD-10-CM

## 2018-12-19 DIAGNOSIS — B20 HUMAN IMMUNODEFICIENCY VIRUS [HIV] DISEASE: ICD-10-CM

## 2018-12-19 DIAGNOSIS — Z48.89 ENCOUNTER FOR OTHER SPECIFIED SURGICAL AFTERCARE: ICD-10-CM

## 2019-01-08 ENCOUNTER — APPOINTMENT (OUTPATIENT)
Dept: CARDIOLOGY | Facility: CLINIC | Age: 39
End: 2019-01-08

## 2019-01-22 ENCOUNTER — APPOINTMENT (OUTPATIENT)
Dept: ANTEPARTUM | Facility: CLINIC | Age: 39
End: 2019-01-22

## 2019-01-31 ENCOUNTER — APPOINTMENT (OUTPATIENT)
Dept: ANTEPARTUM | Facility: CLINIC | Age: 39
End: 2019-01-31

## 2019-02-15 ENCOUNTER — TRANSCRIPTION ENCOUNTER (OUTPATIENT)
Age: 39
End: 2019-02-15

## 2019-02-22 ENCOUNTER — OUTPATIENT (OUTPATIENT)
Dept: OUTPATIENT SERVICES | Facility: HOSPITAL | Age: 39
LOS: 1 days | Discharge: HOME | End: 2019-02-22

## 2019-02-22 ENCOUNTER — APPOINTMENT (OUTPATIENT)
Dept: OBGYN | Facility: CLINIC | Age: 39
End: 2019-02-22

## 2019-02-22 VITALS
SYSTOLIC BLOOD PRESSURE: 180 MMHG | HEIGHT: 67 IN | WEIGHT: 286.02 LBS | DIASTOLIC BLOOD PRESSURE: 100 MMHG | BODY MASS INDEX: 44.89 KG/M2

## 2019-02-22 VITALS — SYSTOLIC BLOOD PRESSURE: 218 MMHG | DIASTOLIC BLOOD PRESSURE: 99 MMHG

## 2019-02-22 VITALS — DIASTOLIC BLOOD PRESSURE: 100 MMHG | SYSTOLIC BLOOD PRESSURE: 180 MMHG

## 2019-02-25 NOTE — END OF VISIT
[] : Resident [FreeTextEntry3] : s/p c/s and BTL, sent home on procardia 60mg XL and labetalol 200mg BID, not taking it as instructed. BP very elevated today but pt asymptomatic. Pt needs to take BP meds as instructed and see PCP for BP control. rtc in 1 wk. needs script for gdm

## 2019-02-25 NOTE — HISTORY OF PRESENT ILLNESS
[Postpartum Follow Up] : postpartum follow up [Complications:___] : complications include: [unfilled] [Delivery Date: ___] : on [unfilled] [Primary C/S] : delivered by  section [Male] : Delivery History: baby boy [Wt. ___] : weighing [unfilled] [BTL] : bilateral tubal ligation completed [Discharge HCT: ___] : hematocrit level was [unfilled] [Discharge HGB: ___] : hemoglobin level was [unfilled] [Resumed Menses] : has resumed her menses [Resumed [place]] : has resumed intercourse [Intended Contraception] : Intended Contraception: [Tubal Ligation] : tubal ligation [Clean/Dry/Intact] : clean, dry and intact [Healed] : healed [None] : no vaginal bleeding [Normal] : the vagina was normal [Examination Of The Breasts] : breasts are normal [No Sign of Infection] : is showing no signs of infection [Excellent Pain Control] : has excellent pain control [Breastfeeding] : not currently nursing [Abdominal Pain] : no abdominal pain [Breast Pain] : no breast pain [Chest Pain] : no chest pain [S/Sx PP Depression] : no signs/symptoms of postpartum depression [Heavy Bleeding] : no heavy bleeding [Incisional Drainage] : no incisional drainage [Incisional Pain] : no incisional pain [Leg Pain] : no leg pain [Shortness of Breath] : no shortness of breath [Headache] : no headache [Nausea] : no nausea [Vomiting] : no vomiting [Erythema] : not erythematous [Swelling] : not swollen [FreeTextEntry9] : cHTN w/ superimposed preeclampsia w/ severe features, s/p Mg for seizure ppx, prescribed 200mg labetalol BID and procardia 60xl daily [de-identified] : HIV on HAART, CHTN s superimposed preeclampsia s/p Mg, GDM [de-identified] : Blood Pressure severely elevated in clinic. Pt has been severely noncomlpiant with BP meds. Has not taken procardia since delivery. Stopped taking labetalol the end of December and only resumed 5 days ago. Reports she is asymptomatic. She has been taking her HIV medication and has follow with ID in 2 weeks. [de-identified] : difficult to assess uterus size due to body habitus [de-identified] : 38 yo P4, s/p primary c/s and BTL, HIV, pos on meds, cHTN w/ superimposed preeclampsia s/p Mg, GDM, very noncompliant [de-identified] : Pt to resume taking Procardia and Labetalol as prescribed, referred to PCP for BP management, follow up w/ ID in 2wks, will RTC 1wk for BP check. will need script for 2h gtt then

## 2019-02-26 DIAGNOSIS — B20 HUMAN IMMUNODEFICIENCY VIRUS [HIV] DISEASE: ICD-10-CM

## 2019-02-28 ENCOUNTER — LABORATORY RESULT (OUTPATIENT)
Age: 39
End: 2019-02-28

## 2019-03-01 LAB
ALBUMIN SERPL ELPH-MCNC: 4.4 G/DL
ALP BLD-CCNC: 78 U/L
ALT SERPL-CCNC: 35 U/L
ANION GAP SERPL CALC-SCNC: 12 MMOL/L
AST SERPL-CCNC: 39 U/L
BILIRUB SERPL-MCNC: 0.5 MG/DL
BUN SERPL-MCNC: 9 MG/DL
CALCIUM SERPL-MCNC: 8.6 MG/DL
CHLORIDE SERPL-SCNC: 102 MMOL/L
CO2 SERPL-SCNC: 25 MMOL/L
CREAT SERPL-MCNC: 0.9 MG/DL
GLUCOSE SERPL-MCNC: 97 MG/DL
LDH SERPL-CCNC: 242
POTASSIUM SERPL-SCNC: 4.1 MMOL/L
PROT SERPL-MCNC: 9 G/DL
SODIUM SERPL-SCNC: 139 MMOL/L
URATE SERPL-MCNC: 4.8 MG/DL

## 2019-03-22 ENCOUNTER — APPOINTMENT (OUTPATIENT)
Dept: OBGYN | Facility: CLINIC | Age: 39
End: 2019-03-22

## 2019-04-11 ENCOUNTER — EMERGENCY (EMERGENCY)
Facility: HOSPITAL | Age: 39
LOS: 0 days | Discharge: HOME | End: 2019-04-12
Attending: EMERGENCY MEDICINE | Admitting: EMERGENCY MEDICINE
Payer: MEDICAID

## 2019-04-11 VITALS
HEIGHT: 67 IN | SYSTOLIC BLOOD PRESSURE: 224 MMHG | DIASTOLIC BLOOD PRESSURE: 100 MMHG | WEIGHT: 285.06 LBS | HEART RATE: 67 BPM | OXYGEN SATURATION: 98 % | TEMPERATURE: 98 F | RESPIRATION RATE: 20 BRPM

## 2019-04-11 DIAGNOSIS — E11.9 TYPE 2 DIABETES MELLITUS WITHOUT COMPLICATIONS: ICD-10-CM

## 2019-04-11 DIAGNOSIS — I10 ESSENTIAL (PRIMARY) HYPERTENSION: ICD-10-CM

## 2019-04-11 DIAGNOSIS — R07.89 OTHER CHEST PAIN: ICD-10-CM

## 2019-04-11 DIAGNOSIS — Z79.899 OTHER LONG TERM (CURRENT) DRUG THERAPY: ICD-10-CM

## 2019-04-11 LAB
HCT VFR BLD CALC: 34.7 % — LOW (ref 37–47)
HGB BLD-MCNC: 10.8 G/DL — LOW (ref 12–16)
MCHC RBC-ENTMCNC: 23.3 PG — LOW (ref 27–31)
MCHC RBC-ENTMCNC: 31.1 G/DL — LOW (ref 32–37)
MCV RBC AUTO: 74.9 FL — LOW (ref 81–99)
NRBC # BLD: 0 /100 WBCS — SIGNIFICANT CHANGE UP (ref 0–0)
PLATELET # BLD AUTO: 209 K/UL — SIGNIFICANT CHANGE UP (ref 130–400)
RBC # BLD: 4.63 M/UL — SIGNIFICANT CHANGE UP (ref 4.2–5.4)
RBC # FLD: 16.6 % — HIGH (ref 11.5–14.5)
WBC # BLD: 5.54 K/UL — SIGNIFICANT CHANGE UP (ref 4.8–10.8)
WBC # FLD AUTO: 5.54 K/UL — SIGNIFICANT CHANGE UP (ref 4.8–10.8)

## 2019-04-11 PROCEDURE — 99285 EMERGENCY DEPT VISIT HI MDM: CPT | Mod: 25

## 2019-04-11 PROCEDURE — 93010 ELECTROCARDIOGRAM REPORT: CPT

## 2019-04-11 NOTE — ED PROVIDER NOTE - OBJECTIVE STATEMENT
39F with pmh of HTN and HIV on raltegravir presents with CP beginning 2-3 hours ago. PT states that pain is sharp and stabbing, worse with movement, without radiation. No n/v/d, SOB, abd pain. States that she was diagnosed with gestational HTN for which she takes labetalol which she had not taken for the past 2 days, until 7pm today. 39F with pmh of HTN presents with CP beginning 2-3 hours ago. PT states that pain is sharp and stabbing, worse with movement, without radiation. No n/v/d, SOB, abd pain. States that she was diagnosed with gestational HTN for which she takes labetalol which she had not taken for the past 2 days, until 7pm today.    PT denies hx of HIV.

## 2019-04-11 NOTE — ED PROVIDER NOTE - CARE PROVIDER_API CALL
Justin Joseph)  Internal Medicine  [address] [place] [address]  Phone: [phone]  Fax: [phone]  Follow Up Time:

## 2019-04-11 NOTE — ED PROVIDER NOTE - PROGRESS NOTE DETAILS
HEART score of 2. PT low risk. 2 sets negative. PT states that she is feeling better and would like to go home. D/c with instructions to follow up with PMD re:hypertension management. Patient to be discharged from ED. Any available test results were discussed with patient and/or family. Verbal instructions given, including instructions to return to ED immediately for any new, worsening, or concerning symptoms. Patient endorsed understanding. Written discharge instructions additionally given, including follow-up plan.

## 2019-04-11 NOTE — ED PROVIDER NOTE - ATTENDING CONTRIBUTION TO CARE
40 yo f c/o mid chest pain. sharp. non pleuritic. worse with palp and movement. carrying young kids at home makes it worse. no sob, leg pain or swelling. no pe or dvt rf. no cough or fever. pt in nad, ent nml neck sup ctab, rrr, ab soft, nt, nd. no calf tender homans, cords, edema. no focal def. +tender over sternal region. no rash. no crep.   will get ekg, labs, cxr. perc neg. 38 yo f c/o mid chest pain. sharp. non pleuritic. worse with palp and movement. carrying young kids at home makes it worse. no sob, leg pain or swelling. no pe or dvt rf. no cough or fever. pt in nad, ent nml neck sup ctab, rrr, ab soft, nt, nd. no calf tender homans, cords, edema. no focal def. +tender over sternal region. no rash. no crep. pts bp el;ev. no ha. is on labetolol but ran out and missed the last 2 days of doses until this evening.   will get ekg, labs, cxr. perc neg.

## 2019-04-11 NOTE — ED PROVIDER NOTE - CLINICAL SUMMARY MEDICAL DECISION MAKING FREE TEXT BOX
pt w atypical/chest wall pain. reproducible on exam, neg trops x2, neg cxr, labs unremarkable. ekgs unremarkable. pt with EVETTE 0 and perc neg. dc;'d home.

## 2019-04-11 NOTE — ED PROVIDER NOTE - NS ED ROS FT
Constitutional: (-) fever (-) vomiting  Eyes/ENT: (-) eye discharge, (-) runny nose  Cardiovascular: (-) chest pain, (-) syncope  Respiratory: (-) cough, (-) shortness of breath  Gastrointestinal: (-) vomiting, (-) diarrhea, (-) abdominal pain  : (-) dysuria   Musculoskeletal: (-) neck pain, (-) back pain, (-) joint pain  Integumentary: (-) rash, (-) edema  Neurological: (-) headache, (-)loc  Allergic/Immunologic: (-) pruritus  Endocrine: No history of thyroid disease or diabetes.

## 2019-04-11 NOTE — ED PROVIDER NOTE - PHYSICAL EXAMINATION
CONSTITUTIONAL: Well-developed; well-nourished; in no acute distress.   SKIN: warm, dry  HEAD: Normocephalic; atraumatic.  EYES: PERRL, EOMI, no conjunctival erythema  ENT: No nasal discharge; airway clear.  NECK: Supple; non tender.  CARD: S1, S2 normal; no murmurs, gallops, or rubs. Regular rate and rhythm. TTP midsternally  RESP: No wheezes, rales or rhonchi.  ABD: soft ntnd  EXT: Normal ROM.  No clubbing, cyanosis or edema.   LYMPH: No acute cervical adenopathy.  NEURO: Alert, oriented, grossly unremarkable  PSYCH: Cooperative, appropriate.

## 2019-04-12 VITALS
HEART RATE: 70 BPM | DIASTOLIC BLOOD PRESSURE: 98 MMHG | OXYGEN SATURATION: 100 % | SYSTOLIC BLOOD PRESSURE: 196 MMHG | RESPIRATION RATE: 18 BRPM

## 2019-04-12 LAB
ALBUMIN SERPL ELPH-MCNC: 3.7 G/DL — SIGNIFICANT CHANGE UP (ref 3.5–5.2)
ALP SERPL-CCNC: 71 U/L — SIGNIFICANT CHANGE UP (ref 30–115)
ALT FLD-CCNC: 24 U/L — SIGNIFICANT CHANGE UP (ref 0–41)
ANION GAP SERPL CALC-SCNC: 7 MMOL/L — SIGNIFICANT CHANGE UP (ref 7–14)
AST SERPL-CCNC: 31 U/L — SIGNIFICANT CHANGE UP (ref 0–41)
BILIRUB SERPL-MCNC: 0.3 MG/DL — SIGNIFICANT CHANGE UP (ref 0.2–1.2)
BUN SERPL-MCNC: 10 MG/DL — SIGNIFICANT CHANGE UP (ref 10–20)
CALCIUM SERPL-MCNC: 8.4 MG/DL — LOW (ref 8.5–10.1)
CHLORIDE SERPL-SCNC: 106 MMOL/L — SIGNIFICANT CHANGE UP (ref 98–110)
CO2 SERPL-SCNC: 26 MMOL/L — SIGNIFICANT CHANGE UP (ref 17–32)
CREAT SERPL-MCNC: 1.2 MG/DL — SIGNIFICANT CHANGE UP (ref 0.7–1.5)
GLUCOSE SERPL-MCNC: 100 MG/DL — HIGH (ref 70–99)
POTASSIUM SERPL-MCNC: 3.7 MMOL/L — SIGNIFICANT CHANGE UP (ref 3.5–5)
POTASSIUM SERPL-SCNC: 3.7 MMOL/L — SIGNIFICANT CHANGE UP (ref 3.5–5)
PROT SERPL-MCNC: 8.7 G/DL — HIGH (ref 6–8)
SODIUM SERPL-SCNC: 139 MMOL/L — SIGNIFICANT CHANGE UP (ref 135–146)
TROPONIN T SERPL-MCNC: <0.01 NG/ML — SIGNIFICANT CHANGE UP
TROPONIN T SERPL-MCNC: <0.01 NG/ML — SIGNIFICANT CHANGE UP

## 2019-04-12 PROCEDURE — 71045 X-RAY EXAM CHEST 1 VIEW: CPT | Mod: 26

## 2019-04-12 RX ADMIN — Medication 5 MILLIGRAM(S): at 02:13

## 2019-04-12 NOTE — ED ADULT NURSE NOTE - OBJECTIVE STATEMENT
pt aox4 complaining of chest pain , denies sob, dizziness, headache.  respirations even / unlabored.  pt hypertensive on initial assessment.  iv in place, labs sent. placed on cardiac monitor.  md at bedside for eval. will continue to monitor / assess

## 2019-05-01 ENCOUNTER — OUTPATIENT (OUTPATIENT)
Dept: OUTPATIENT SERVICES | Facility: HOSPITAL | Age: 39
LOS: 1 days | End: 2019-05-01

## 2019-05-06 ENCOUNTER — TRANSCRIPTION ENCOUNTER (OUTPATIENT)
Age: 39
End: 2019-05-06

## 2019-05-24 ENCOUNTER — TRANSCRIPTION ENCOUNTER (OUTPATIENT)
Age: 39
End: 2019-05-24

## 2019-05-29 ENCOUNTER — INPATIENT (INPATIENT)
Facility: HOSPITAL | Age: 39
LOS: 0 days | Discharge: LEFT BEFORE DISCHARGE | End: 2019-05-30
Attending: INTERNAL MEDICINE | Admitting: INTERNAL MEDICINE
Payer: MEDICAID

## 2019-05-29 VITALS
RESPIRATION RATE: 18 BRPM | HEART RATE: 63 BPM | TEMPERATURE: 97 F | SYSTOLIC BLOOD PRESSURE: 237 MMHG | OXYGEN SATURATION: 99 % | DIASTOLIC BLOOD PRESSURE: 117 MMHG

## 2019-05-29 LAB
ALBUMIN SERPL ELPH-MCNC: 3.9 G/DL — SIGNIFICANT CHANGE UP (ref 3.5–5.2)
ALP SERPL-CCNC: 72 U/L — SIGNIFICANT CHANGE UP (ref 30–115)
ALT FLD-CCNC: 20 U/L — SIGNIFICANT CHANGE UP (ref 0–41)
ANION GAP SERPL CALC-SCNC: 9 MMOL/L — SIGNIFICANT CHANGE UP (ref 7–14)
AST SERPL-CCNC: 30 U/L — SIGNIFICANT CHANGE UP (ref 0–41)
BASOPHILS # BLD AUTO: 0.02 K/UL — SIGNIFICANT CHANGE UP (ref 0–0.2)
BASOPHILS NFR BLD AUTO: 0.4 % — SIGNIFICANT CHANGE UP (ref 0–1)
BILIRUB SERPL-MCNC: 0.4 MG/DL — SIGNIFICANT CHANGE UP (ref 0.2–1.2)
BUN SERPL-MCNC: 12 MG/DL — SIGNIFICANT CHANGE UP (ref 10–20)
CALCIUM SERPL-MCNC: 8.8 MG/DL — SIGNIFICANT CHANGE UP (ref 8.5–10.1)
CHLORIDE SERPL-SCNC: 99 MMOL/L — SIGNIFICANT CHANGE UP (ref 98–110)
CO2 SERPL-SCNC: 25 MMOL/L — SIGNIFICANT CHANGE UP (ref 17–32)
CREAT SERPL-MCNC: 0.9 MG/DL — SIGNIFICANT CHANGE UP (ref 0.7–1.5)
EOSINOPHIL # BLD AUTO: 0.03 K/UL — SIGNIFICANT CHANGE UP (ref 0–0.7)
EOSINOPHIL NFR BLD AUTO: 0.6 % — SIGNIFICANT CHANGE UP (ref 0–8)
GLUCOSE SERPL-MCNC: 85 MG/DL — SIGNIFICANT CHANGE UP (ref 70–99)
HCT VFR BLD CALC: 38.7 % — SIGNIFICANT CHANGE UP (ref 37–47)
HGB BLD-MCNC: 12.1 G/DL — SIGNIFICANT CHANGE UP (ref 12–16)
IMM GRANULOCYTES NFR BLD AUTO: 0.4 % — HIGH (ref 0.1–0.3)
LYMPHOCYTES # BLD AUTO: 2.2 K/UL — SIGNIFICANT CHANGE UP (ref 1.2–3.4)
LYMPHOCYTES # BLD AUTO: 46.2 % — SIGNIFICANT CHANGE UP (ref 20.5–51.1)
MCHC RBC-ENTMCNC: 24 PG — LOW (ref 27–31)
MCHC RBC-ENTMCNC: 31.3 G/DL — LOW (ref 32–37)
MCV RBC AUTO: 76.8 FL — LOW (ref 81–99)
MONOCYTES # BLD AUTO: 0.37 K/UL — SIGNIFICANT CHANGE UP (ref 0.1–0.6)
MONOCYTES NFR BLD AUTO: 7.8 % — SIGNIFICANT CHANGE UP (ref 1.7–9.3)
NEUTROPHILS # BLD AUTO: 2.12 K/UL — SIGNIFICANT CHANGE UP (ref 1.4–6.5)
NEUTROPHILS NFR BLD AUTO: 44.6 % — SIGNIFICANT CHANGE UP (ref 42.2–75.2)
NRBC # BLD: 0 /100 WBCS — SIGNIFICANT CHANGE UP (ref 0–0)
PLATELET # BLD AUTO: 249 K/UL — SIGNIFICANT CHANGE UP (ref 130–400)
POTASSIUM SERPL-MCNC: 4.1 MMOL/L — SIGNIFICANT CHANGE UP (ref 3.5–5)
POTASSIUM SERPL-SCNC: 4.1 MMOL/L — SIGNIFICANT CHANGE UP (ref 3.5–5)
PROT SERPL-MCNC: 10.3 G/DL — HIGH (ref 6–8)
RBC # BLD: 5.04 M/UL — SIGNIFICANT CHANGE UP (ref 4.2–5.4)
RBC # FLD: 16.4 % — HIGH (ref 11.5–14.5)
SODIUM SERPL-SCNC: 133 MMOL/L — LOW (ref 135–146)
WBC # BLD: 4.76 K/UL — LOW (ref 4.8–10.8)
WBC # FLD AUTO: 4.76 K/UL — LOW (ref 4.8–10.8)

## 2019-05-29 PROCEDURE — 99291 CRITICAL CARE FIRST HOUR: CPT

## 2019-05-29 PROCEDURE — 70450 CT HEAD/BRAIN W/O DYE: CPT | Mod: 26

## 2019-05-29 PROCEDURE — 93010 ELECTROCARDIOGRAM REPORT: CPT

## 2019-05-29 RX ORDER — AMLODIPINE BESYLATE 2.5 MG/1
5 TABLET ORAL ONCE
Refills: 0 | Status: COMPLETED | OUTPATIENT
Start: 2019-05-29 | End: 2019-05-29

## 2019-05-29 RX ORDER — LABETALOL HCL 100 MG
10 TABLET ORAL ONCE
Refills: 0 | Status: DISCONTINUED | OUTPATIENT
Start: 2019-05-29 | End: 2019-05-30

## 2019-05-29 RX ORDER — ACETAMINOPHEN 500 MG
650 TABLET ORAL ONCE
Refills: 0 | Status: COMPLETED | OUTPATIENT
Start: 2019-05-29 | End: 2019-05-29

## 2019-05-29 RX ORDER — HYDRALAZINE HCL 50 MG
25 TABLET ORAL ONCE
Refills: 0 | Status: COMPLETED | OUTPATIENT
Start: 2019-05-29 | End: 2019-05-29

## 2019-05-29 RX ADMIN — AMLODIPINE BESYLATE 5 MILLIGRAM(S): 2.5 TABLET ORAL at 21:05

## 2019-05-29 RX ADMIN — Medication 25 MILLIGRAM(S): at 21:05

## 2019-05-29 RX ADMIN — Medication 650 MILLIGRAM(S): at 22:50

## 2019-05-29 NOTE — ED ADULT NURSE NOTE - NSIMPLEMENTINTERV_GEN_ALL_ED
Implemented All Universal Safety Interventions:  [place] to call system. Call bell, personal items and telephone within reach. Instruct patient to call for assistance. Room bathroom lighting operational. Non-slip footwear when patient is off stretcher. Physically safe environment: no spills, clutter or unnecessary equipment. Stretcher in lowest position, wheels locked, appropriate side rails in place.

## 2019-05-29 NOTE — ED ADULT TRIAGE NOTE - CHIEF COMPLAINT QUOTE
patient was send in by PMD for high blood pressure, and right sided facial numbness and tingling with mild facial droop with decreased in taste bud since 1230 pm, no other neuro deficit noted at this time, expressed recent sinus infection last week.

## 2019-05-29 NOTE — ED PROVIDER NOTE - CLINICAL SUMMARY MEDICAL DECISION MAKING FREE TEXT BOX
Pt presented with facial droop and slurred speech. ED workup wnl. Evaluated by neuro NP recommended admission for further workup.

## 2019-05-29 NOTE — ED ADULT NURSE NOTE - OBJECTIVE STATEMENT
Patient seen in bed 8B, presenting after being sent by primary care provider for hypertension, pain of 7/10 in posterior of head. And also numbness in right side of face in upper lip. Mild swelling of right side of face noted.

## 2019-05-29 NOTE — ED PROVIDER NOTE - OBJECTIVE STATEMENT
39y F w PMH HTN not compliant with medications presents from PCP for R sided facial paralysis that started today around 1330. Pt went to see her PCP who suggested that she start amlodipine 5mg and hydralazine 25mg daily for BP control as her BP was in the 200s systolic. Pt states this is what her BP has been for some time. Per pt, around 1330, she had gradual onset of a feeling of R facial swelling. Her PC noticed that she was not able to fully move the R side of her face and felt that she was slurring her words. Because of the concurrent HTN, recommended that she come the ED.   Also complaining of R posterior headache, and states she has had occasional blurry vision over the last few months, but not right now.   No CP or SOB. No change in urinary habits. No n/v/d/f/c.

## 2019-05-29 NOTE — ED PROVIDER NOTE - CARE PLAN
Principal Discharge DX:	Facial paralysis with forehead involvement  Secondary Diagnosis:	Uncontrolled hypertension  Secondary Diagnosis:	Tongue deviation

## 2019-05-29 NOTE — ED PROVIDER NOTE - PROGRESS NOTE DETAILS
Signed off care to Dr. GRANT Ellis who will f/u neuro evaluation. Discussed with Neuro NP who recommends admission to telemetry for further neuro evaluation. Pt evaluated by me. Presented with facial droop, slurred speech and has tongue deviation on exam. Pending neurology workup.

## 2019-05-29 NOTE — ED PROVIDER NOTE - PHYSICAL EXAMINATION
Constitutional: Well developed, well nourished. NAD. Morbid obesity.   Head: Atraumatic.  Eyes: PERRLA. EOMI without discomfort.   ENT: No nasal discharge. Mucous membranes moist.  Neck: Supple. Painless ROM.  Cardiovascular: Regular rhythm. Regular rate. Normal S1 and S2. No murmurs. 2+ pulses in all extremities.   Pulmonary: Normal respiratory rate and effort. Lungs clear to auscultation bilaterally. No wheezing, rales, or rhonchi. Bilateral, equal lung expansion.   Abdominal: Soft. Nondistended. Nontender. No rebound or guarding.   Extremities: Pelvis stable. No lower extremity edema. Symmetric calves.  Skin: No rashes.   Neuro: AAOx3. CN 2-12 intact. Strength 5/5 in all extremities. Normal gait. Normal sensation. Normal finger-to-nose. Normal rapid repeated movements. Normal heel-to-shin. No nystagmus. No drift. Romberg negative. R sided facial paralysis including the forehead. Mild dysarthria. Tongue deviation to the left.   Psych: Normal mood. Normal affect.

## 2019-05-29 NOTE — ED PROVIDER NOTE - ATTENDING CONTRIBUTION TO CARE
39yoF with h/o uncontrolled HTN (states usual in 180's-200's) since pregnancy, delivered in December, presents with HTN and facial drooping. Reports R facial feeling of numbness and drooping with acute onset at 1 pm today. Associated slurred speech possibly 2/2 the droop, feeling of R eye not fully closed. Associated 3 days of decreased taste and mild R occipital HA with intermittent mild blurry vision. Denies CP, SOB, dizziness, vomiting, or any other symptoms. On exam, afebrile, hemodynamically stable, saturating well, NAD, well appearing, PERRL, EOMI, anicteric, MMM, RRR, nml S1/S2, no m/r/g, lungs CTAB, no w/r/r, abd soft, NT, ND, nml BS, no rebound or guarding, AAO, R facial droop and weakness with eye closure and opening eye/raising R forehead, no tongue deviation, no objective facial numbness, mild dysarthria, motor 5/5 and sens symm in all extrems, WONG spontaneously, no leg cyanosis or edema, skin warm, well perfused, no rashes or hives. Character very low suspicion for CVA, dissection, or ICH. Character high suspicion for Bell's palsy. Furthermore pt with chronic HTN, and low suspicion for acute neurologic deficits 2/2 HTN. 39yoF with h/o uncontrolled HTN (states usual in 180's-200's) since pregnancy, delivered in December, presents with HTN and facial drooping. Reports R facial feeling of numbness and drooping with acute onset at 1 pm today. Associated slurred speech possibly 2/2 the droop, feeling of R eye not fully closed. Associated 3 days of decreased taste and mild R occipital HA with intermittent mild blurry vision. Denies CP, SOB, dizziness, vomiting, or any other symptoms. On exam, afebrile, hemodynamically stable, saturating well, NAD, well appearing, PERRL, EOMI, anicteric, MMM, RRR, nml S1/S2, no m/r/g, lungs CTAB, no w/r/r, abd soft, NT, ND, nml BS, no rebound or guarding, AAO, R facial droop and weakness with eye closure and opening eye/raising R forehead, no tongue deviation, no objective facial numbness, mild dysarthria, motor 5/5 and sens symm in all extrems, WONG spontaneously, no leg cyanosis or edema, skin warm, well perfused, no rashes or hives. Character very low suspicion for CVA, dissection, or ICH. Character high suspicion for Bell's palsy. Furthermore pt with chronic HTN, and low suspicion for acute neurologic deficits 2/2 HTN. Pt very well appearing, hemodynamically stable at this time, BP currently is at her usual level. Awaiting formal neuro evaluation. Signed off care to Dr. GRANT Ellis who will f/u neuro evaluation. 39yoF with h/o uncontrolled HTN (states usual in 180's-200's) since pregnancy, delivered in December, presents with HTN and facial drooping. Reports R facial feeling of numbness and drooping with acute onset at 1 pm today. Associated slurred speech possibly 2/2 the droop, feeling of R eye not fully closed. Associated 3 days of decreased taste and mild R occipital HA with intermittent mild blurry vision. Denies CP, SOB, dizziness, vomiting, or any other symptoms. On exam, afebrile, hemodynamically stable, saturating well, NAD, well appearing, PERRL, EOMI, anicteric, MMM, RRR, nml S1/S2, no m/r/g, lungs CTAB, no w/r/r, abd soft, NT, ND, nml BS, no rebound or guarding, AAO, R facial droop and weakness with eye closure and opening eye/raising R forehead, no tongue deviation, no objective facial numbness, mild dysarthria, motor 5/5 and sens symm in all extrems, WONG spontaneously, no leg cyanosis or edema, skin warm, well perfused, no rashes or hives. Character very low suspicion for CVA, dissection, or ICH. Character high suspicion for Bell's palsy. Furthermore pt with chronic HTN, and low suspicion for acute neurologic deficits 2/2 HTN. Character and exam of low suspicion for dural venous thrombosis or infection. Pt very well appearing, hemodynamically stable at this time, BP currently is at her usual level. Awaiting formal neuro evaluation. Signed off care to Dr. GRANT Ellis who will f/u neuro evaluation.

## 2019-05-29 NOTE — ED PROVIDER NOTE - NS ED ROS FT
Constitutional: No fever or chills. Normal appetite. No unintended weight loss.   Eyes: No vision changes.  ENT: No ear pain. No sore throat.  Neck: No neck pain or stiffness.  Cardiovascular: No chest pain, palpitations, or edema.  Pulmonary: No cough or SOB. No hemoptysis.  Abdominal: No abdominal pain, nausea, vomiting, or diarrhea.   : No dysuria or frequency. No hematuria.   Neuro: No syncope, or dizziness.  MS: No back pain. No calf pain/swelling.  Psych: No suicidal or homicidal ideations.

## 2019-05-30 ENCOUNTER — TRANSCRIPTION ENCOUNTER (OUTPATIENT)
Age: 39
End: 2019-05-30

## 2019-05-30 VITALS
SYSTOLIC BLOOD PRESSURE: 193 MMHG | HEART RATE: 95 BPM | RESPIRATION RATE: 18 BRPM | TEMPERATURE: 97 F | DIASTOLIC BLOOD PRESSURE: 88 MMHG

## 2019-05-30 PROCEDURE — 70544 MR ANGIOGRAPHY HEAD W/O DYE: CPT | Mod: 26,59

## 2019-05-30 PROCEDURE — 99222 1ST HOSP IP/OBS MODERATE 55: CPT

## 2019-05-30 PROCEDURE — 70551 MRI BRAIN STEM W/O DYE: CPT | Mod: 26

## 2019-05-30 PROCEDURE — 70549 MR ANGIOGRAPH NECK W/O&W/DYE: CPT | Mod: 26

## 2019-05-30 RX ORDER — LABETALOL HCL 100 MG
200 TABLET ORAL
Refills: 0 | Status: DISCONTINUED | OUTPATIENT
Start: 2019-05-30 | End: 2019-05-30

## 2019-05-30 RX ORDER — LABETALOL HCL 100 MG
10 TABLET ORAL ONCE
Refills: 0 | Status: COMPLETED | OUTPATIENT
Start: 2019-05-30 | End: 2019-05-30

## 2019-05-30 RX ORDER — VALACYCLOVIR 500 MG/1
1000 TABLET, FILM COATED ORAL ONCE
Refills: 0 | Status: COMPLETED | OUTPATIENT
Start: 2019-05-30 | End: 2019-05-30

## 2019-05-30 RX ORDER — ENOXAPARIN SODIUM 100 MG/ML
40 INJECTION SUBCUTANEOUS DAILY
Refills: 0 | Status: DISCONTINUED | OUTPATIENT
Start: 2019-05-30 | End: 2019-05-30

## 2019-05-30 RX ORDER — AMLODIPINE BESYLATE 2.5 MG/1
1 TABLET ORAL
Qty: 0 | Refills: 0 | DISCHARGE

## 2019-05-30 RX ORDER — ACETAMINOPHEN 500 MG
650 TABLET ORAL EVERY 6 HOURS
Refills: 0 | Status: DISCONTINUED | OUTPATIENT
Start: 2019-05-30 | End: 2019-05-30

## 2019-05-30 RX ORDER — RALTEGRAVIR 400 MG/1
400 TABLET, FILM COATED ORAL
Refills: 0 | Status: DISCONTINUED | OUTPATIENT
Start: 2019-05-30 | End: 2019-05-30

## 2019-05-30 RX ORDER — EMTRICITABINE AND TENOFOVIR DISOPROXIL FUMARATE 200; 300 MG/1; MG/1
1 TABLET, FILM COATED ORAL DAILY
Refills: 0 | Status: DISCONTINUED | OUTPATIENT
Start: 2019-05-30 | End: 2019-05-30

## 2019-05-30 RX ORDER — HYDRALAZINE HCL 50 MG
1 TABLET ORAL
Qty: 0 | Refills: 0 | DISCHARGE

## 2019-05-30 RX ORDER — METOCLOPRAMIDE HCL 10 MG
10 TABLET ORAL ONCE
Refills: 0 | Status: COMPLETED | OUTPATIENT
Start: 2019-05-30 | End: 2019-05-30

## 2019-05-30 RX ORDER — LISINOPRIL 2.5 MG/1
5 TABLET ORAL DAILY
Refills: 0 | Status: DISCONTINUED | OUTPATIENT
Start: 2019-05-30 | End: 2019-05-30

## 2019-05-30 RX ORDER — NIFEDIPINE 30 MG
60 TABLET, EXTENDED RELEASE 24 HR ORAL DAILY
Refills: 0 | Status: DISCONTINUED | OUTPATIENT
Start: 2019-05-30 | End: 2019-05-30

## 2019-05-30 RX ADMIN — Medication 2 DROP(S): at 12:52

## 2019-05-30 RX ADMIN — Medication 10 MILLIGRAM(S): at 05:57

## 2019-05-30 RX ADMIN — LISINOPRIL 5 MILLIGRAM(S): 2.5 TABLET ORAL at 11:06

## 2019-05-30 RX ADMIN — RALTEGRAVIR 400 MILLIGRAM(S): 400 TABLET, FILM COATED ORAL at 18:27

## 2019-05-30 RX ADMIN — Medication 200 MILLIGRAM(S): at 18:27

## 2019-05-30 RX ADMIN — Medication 650 MILLIGRAM(S): at 13:43

## 2019-05-30 RX ADMIN — Medication 60 MILLIGRAM(S): at 05:56

## 2019-05-30 RX ADMIN — Medication 10 MILLIGRAM(S): at 07:56

## 2019-05-30 RX ADMIN — EMTRICITABINE AND TENOFOVIR DISOPROXIL FUMARATE 1 TABLET(S): 200; 300 TABLET, FILM COATED ORAL at 12:53

## 2019-05-30 RX ADMIN — VALACYCLOVIR 1000 MILLIGRAM(S): 500 TABLET, FILM COATED ORAL at 05:57

## 2019-05-30 NOTE — H&P ADULT - NSHPPHYSICALEXAM_GEN_ALL_CORE
T(F): 97  HR: 67  BP: 173/88  RR: 18  SpO2: 98%      PHYSICAL EXAM:  GENERAL: NAD, well-developed obese female  NECK: Supple, No JVD  CHEST/LUNG: Clear to auscultation bilaterally; No wheeze  HEART: Regular rate and rhythm; No murmurs, rubs, or gallops  ABDOMEN: Soft, Nontender, Nondistended; Bowel sounds present  EXTREMITIES:  2+ Peripheral Pulses, No clubbing, cyanosis, or edema  PSYCH: AAOx3  NEUROLOGY: Right sided flattening of forehead wrinkles, Left sided tongue deviation,. Uvula midline  SKIN: No rashes or lesions

## 2019-05-30 NOTE — H&P ADULT - NSHPPOAPULMEMBOLUS_GEN_A_CORE
Review of Systems/Medical History      No history of anesthetic complications     Cardiovascular  Hyperlipidemia, Hypertension , Past MI , CAD , CHF ,    Pulmonary  Pneumonia, COPD ,        GI/Hepatic    GERD ,          Comment: Prostate     Endo/Other  Diabetes ,      GYN  Negative gynecology ROS          Hematology  Negative hematology ROS      Musculoskeletal    Arthritis     Neurology  Negative neurology ROS      Psychology   Negative psychology ROS              Physical Exam    Airway    Mallampati score: II  TM Distance: >3 FB  Neck ROM: full     Dental   upper dentures,     Cardiovascular      Pulmonary      Other Findings        Anesthesia Plan  ASA Score- 3     Anesthesia Type- IV sedation with anesthesia with ASA Monitors  Additional Monitors:   Airway Plan:     Comment: IV sedation, GA back up; standard ASA monitors  Risks and benefits discussed with patient; patient consented and agrees to proceed  I saw and evaluated the patient  If seen with CRNA, we have discussed the anesthetic plan and I am in agreement that the plan is appropriate for the patient        Plan Factors-    Induction- intravenous  Postoperative Plan- Plan for postoperative opioid use  Informed Consent- Anesthetic plan and risks discussed with patient  I personally reviewed this patient with the CRNA  Discussed and agreed on the Anesthesia Plan with the CRNA  Isrrael Zendejas no

## 2019-05-30 NOTE — CHART NOTE - NSCHARTNOTEFT_GEN_A_CORE
Pt explained multiple times risk of leaving with neurological symptoms and elevated BP. But pt adamant to leave. RN witnessed. will sign ama

## 2019-05-30 NOTE — CONSULT NOTE ADULT - ATTENDING COMMENTS
Patient seen and examined and agree with above except as noted.  Patient has right facial weakness suspicious for [place] Palsy.  Given her HTN history she was kept for an MRI brain  Exam shows R-LMN facial, normal V1-V3, No drift  power 5/5  Temp, Vib, LT symmetric  FTN NL  Gait normal  incomplete eye closure on right; hearing symmetric and no complaints of taste differences    Plan as above (suggestive of [place] palsy, less likely CVA)

## 2019-05-30 NOTE — DISCHARGE NOTE NURSING/CASE MANAGEMENT/SOCIAL WORK - NSDCDPATPORTLINK_GEN_ALL_CORE
You can access the [facility], offered by [facility], by registering with the following website: [email or web address]

## 2019-05-30 NOTE — H&P ADULT - NSHPLABSRESULTS_GEN_ALL_CORE
ICU Vital Signs Last 24 Hrs  T(C): 36.1 (30 May 2019 07:37), Max: 36.3 (29 May 2019 17:08)  T(F): 97 (30 May 2019 07:37), Max: 97.4 (29 May 2019 17:08)  HR: 69 (30 May 2019 07:37) (52 - 72)  BP: 197/88 (30 May 2019 07:37) (175/86 - 239/117)  BP(mean): --  ABP: --  ABP(mean): --  RR: 18 (30 May 2019 07:37) (18 - 18)  SpO2: 98% (30 May 2019 07:37) (98% - 99%)  T(F): 97  HR: 69  BP: 197/88  RR: 18  SpO2: 98%                          12.1   4.76  )-----------( 249      ( 29 May 2019 20:55 )             38.7       05-29    133<L>  |  99  |  12  ----------------------------<  85  4.1   |  25  |  0.9    Ca    8.8      29 May 2019 20:55    TPro  10.3<H>  /  Alb  3.9  /  TBili  0.4  /  DBili  x   /  AST  30  /  ALT  20  /  AlkPhos  72  05-29      LIVER FUNCTIONS - ( 29 May 2019 20:55 )  Alb: 3.9 g/dL / Pro: 10.3 g/dL / ALK PHOS: 72 U/L / ALT: 20 U/L / AST: 30 U/L / GGT: x      EKG  Sinus Alex HR 60  RAD    CTH       IMPRESSION:     No evidence of intracranial hemorrhage, territorial infarct, or mass   effect.    Left maxillary sinus disease.

## 2019-05-30 NOTE — H&P ADULT - ATTENDING COMMENTS
Patient seen and examined independently. Agree with resident note/ history / physical exam and plan of care with following exceptions/additions/updates. Case discussed with house-staff, nursing and patient/pt decision maker.     pt feels that her facial droop has improved. no other neuro deficit.   Vital Signs Last 24 Hrs  T(C): 36.1 (30 May 2019 07:37), Max: 36.3 (29 May 2019 17:08)  T(F): 97 (30 May 2019 07:37), Max: 97.4 (29 May 2019 17:08)  HR: 67 (30 May 2019 09:00) (52 - 72)  BP: 173/88 (30 May 2019 09:00) (173/88 - 239/117)  BP(mean): --  RR: 18 (30 May 2019 07:37) (18 - 18)  SpO2: 98% (30 May 2019 07:37) (98% - 99%)    Physical exam:   constitutional NAD, AAOX3, Respiratory  lungs CTA, CVS heart RRR, GI: abdomen Soft NT, ND, BS+, skin: intact  neuro exam non focal. facial droop +                         12.1   4.76  )-----------( 249      ( 29 May 2019 20:55 )             38.7   05-29    133<L>  |  99  |  12  ----------------------------<  85  4.1   |  25  |  0.9    Ca    8.8      29 May 2019 20:55    TPro  10.3<H>  /  Alb  3.9  /  TBili  0.4  /  DBili  x   /  AST  30  /  ALT  20  /  AlkPhos  72  05-29    < from: CT Head No Cont (05.29.19 @ 21:49) >    No evidence of intracranial hemorrhage, territorial infarct, or mass   effect.    Left maxillary sinus disease.    < end of copied text >    a/p  1- facial droop, rule out cva, check MRI, if negative it is most likely due to bell's palsy. check lyme, cont steroids and acyclovir.   2- hypertensive urgency, meds adjusted, pt is not very compliant with meds  3- DM cont meds  4- Obesity, outpt fu   5- HIV positive status, pt does not want to discuss this, states she is compliant with her meds. ( which is questionable since her pharmacy does not confirm that ) discussed with Dr Dunbar, recommends outpt fu and treatment of poss bells palsy irrelevant to her HIV status ( acyclovir and prednisone) if pt is not discharged today, he will accept pt to his service.     #Progress Note Handoff  Pending (specify):  Consults: neuro  follow up and ID ( if pt not discharged, transfer to Dr Jackson's service)   Family discussion: dw pt, she understands and agrees.   Disposition: Home  dc home if MRI negative. outpt ID follow up.

## 2019-05-30 NOTE — DISCHARGE NOTE PROVIDER - CARE PROVIDER_API CALL
Wesley Mendosa)  [facility]  [address], [facility] [address]  [place] [address]  Phone: [phone]  Fax: [phone]  Follow Up Time:

## 2019-05-30 NOTE — H&P ADULT - HISTORY OF PRESENT ILLNESS
38yo  hx of Chronic Hypertension, gestational DM, HIV, CM IGG positive non compliance with blood pressure medications seen in the ER on  for CP and /100 and d/c'ed with labetolol 200mg BID and Procardia XL 60mg daily presents for R sided facial paralysis that started today around 1330.     patient was well during the day yesterday, but around noon she started noticing right facial droop and experienced right facial numbness symptoms were a/w slurred speech ,intermittent  blurry vision, and right occipital headache which was sharp and intermittent she is also reporting 3 days of decreased taste . Patient further reports that she has been on labetalol 200mg bid which she ran out 12 days ago and since then her SBP is in the 200s. She went to see her PMD today who after examining her sent er to ED for further eval. In her PMDs office patient had a BP of 200/ 114. She denies n/v vertigo ear pain, difficulty swallowing, word finding , extremity weakness or numbness.     Pt went to see her PCP who suggested that she start amlodipine 5mg and hydralazine 25mg daily for BP control as her BP was in the 200s systolic. Pt states this is what her BP has been for some time. Per pt, around 1330, she had gradual onset of a feeling of R facial swelling. Her PC noticed that she was not able to fully move the R side of her face and felt that she was slurring her words. Because of the concurrent HTN, recommended that she come the ED.   	Also complaining of R posterior headache, and states she has had occasional blurry vision over the last few months, but not right now.            last Labetalol use was yesterday morning, and is ordered 200mg BID. Last HIV meds today, takes raltegravir 400mg BID, takes Descovy 200mg/ 25mg once a day. 40yo  hx of Chronic Hypertension, gestational DM, HIV, CM IGG positive non compliance with blood pressure medications seen in the ER on  for CP and /100 and d/c'ed with labetolol 200mg BID and Procardia XL 60mg daily. She went to her her PMD and her /176 and was switched to hydralazine 25mg TID and Norvasc 5mg and was asked to come to the hospital for R sided facial paralysis that started around 1330 yesterday.      Asa per pt, he BP is always elevated during her pregnancies, however during her post partum period this time, her BP has remained elevated. With labetolol BID her BP is 145/86 ( checked 19). However she has not taken her meds x 5 days. Yesterday patient was well during the day, but when she went to see her PMD, he noticed right facial droop, facial numbness, slurred speech,intermittent  blurry vision, and headache In her PMDs office patient had a BP of 200/ 114.     Presently she attests to improvement.

## 2019-05-30 NOTE — H&P ADULT - ASSESSMENT
# Hypertensive Emergency due to noncompliance complicated by [place] Palsy r/o CVA, less likely PRESS   - /117 upon presentation s/p labetolol 10mg IV, Hydralazine 25mg IV and norvasc 5mg and presently 197/88  - Less due to noncompliance K- wnl, less likely Primary hyperaldo, no signs of pheo, not cushingoid appearing pt  - Check TSH  - Restart home medications labetolol 200mg BID and Procardia XL 60mg BID  - Check UA for proteinuria   -Admit to telemetry  -N/c mri brain  -MRA H/N  -BP control  -echo   -lipid profile and hbaic  -Start prednisone 60mg q day x 1 week with quick taper there on  -Artificial tears prn to the right eye  -Neuro attending note will follow    # Hyperproteinemia  - Previously 8.7 and presently 10.3  - Previous Serum Electrophoresis showed -Weak Gamma-Migrating Paraprotein Identified   - Check Serum, Urine Electrophoresis   - DDX Monoclonal vs polyclonal disease  ? MGUS    # HIV  - HIV positive 05/18  - CD4 count -484 (5/18)    # DVT- lovenox     GI - not indicated 40yo  hx of Chronic Hypertension, gestational DM, HIV, CM IGG positive non compliance with blood pressure medications seen in the ER on  for CP and /100 and d/c'ed with labetolol 200mg BID and Procardia XL 60mg daily. She went to her her PMD and her /176 and was switched to hydralazine 25mg TID and Norvasc 5mg and was asked to come to the hospital for R sided facial paralysis that started around 1330 yesterday.      # Hypertensive Emergency due to noncompliance complicated by [place] Palsy r/o CVA, less likely PRESS   - /117 upon presentation s/p labetolol 10mg IV, Hydralazine 25mg IV and norvasc 5mg and presently 197/88  - Less due to noncompliance K- wnl, less likely Primary hyperaldo, no signs of pheo, not cushingoid appearing pt  - Check TSH  - Restart home medications labetolol 200mg BID and Procardia XL 60mg BID  - Check UA for proteinuria   -Admit to telemetry  -N/c mri brain  -MRA H/N  -BP control  -echo   -lipid profile and hbaic  -Start prednisone 60mg q day x 1 week with quick taper there on  -Artificial tears prn to the right eye  -Neuro attending note will follow      # Right sided LMN [place] Palsy  - c/w Steroid 60mg x 1 weeks per neuro recs  - c/w artifical tears for rt eye     # Hyperproteinemia  - Previously 8.7 and presently 10.3  - Previous Serum Electrophoresis showed -Weak Gamma-Migrating Paraprotein Identified   - Check Serum, Urine Electrophoresis   - DDX Monoclonal vs polyclonal disease  ? MGUS    # HIV  - HIV positive   - CD4 count -484 ()   -She did not want to discuss her HIV status infront of her partner. Per pharmacy she hasnt picked up her HAART meds since December, but states she is compliant with her meds   - c/w raltegravir 400mg BID,  Descovy 200mg/ 25mg once a day.     # DVT- lovenox     GI - not indicated

## 2019-05-30 NOTE — CONSULT NOTE ADULT - SUBJECTIVE AND OBJECTIVE BOX
CC: Right facial droop        HPI:   38yo right handed female with h/o Heart murmur,c- section and uncontrolled HTN (states usual in 180's-200's) since pregnancy, delivered past December, presents with HTN and facial drooping. Patient states that she woke up in her usual state of health as of yesterday and around 12 noon she started noticing right facial droop and experienced right facial numbness symptoms were a/w slurred speech ,intermittent  blurry vision, and right occipital headache which was sharp and intermittent she is also reporting 3 days of decreased taste . Patient further reports that she has been on labetalol 200mg bid which she ran out 12 days ago and since then her SBP is in the 200s. She went to see her PMD today who after examining her sent er to ED for further eval. In her PMDs office patient had a BP of 200/ 114. She denies n/v vertigo ear pain, difficulty swallowing, word finding , extremity weakness or numbness.     Home medications  Labetalol 200mg bid    Social history  Denies smoking alcohol or drug use    Family history  Mother with HTN and cirrhosis  at 55 due to complication of cirrhosis  Father with HTN    Other pertinent history  Patient is not on OCP  Had 2 miscarriages in the past      Neuro Exam:  Orientation: oriented to person, place time and situation  Cranial Nerves: EOMI , Eyes midline, vff,                          Right LMN palsy                         Speech is fluent on this exam, normal comprehension , good repeats  Motor: 5/5 Throughout/ no drift  Sensory exam: intact and symmetric  Coordination:. no dysmetria or limb ataxia  Gait: deferred  No neglect.      NIHSS: 3 (complete right facial palsy)      Allergies    No Known Allergies    Intolerances      MEDICATIONS  (STANDING):  methylPREDNISolone sodium succinate Injectable 60 milliGRAM(s) IV Push Once  metoclopramide Injectable 10 milliGRAM(s) IV Push once  valACYclovir 1000 milliGRAM(s) Oral Once    MEDICATIONS  (PRN):      LABS:                        12.1   4.76  )-----------( 249      ( 29 May 2019 20:55 )             38.7     05-    133<L>  |  99  |  12  ----------------------------<  85  4.1   |  25  |  0.9    Ca    8.8      29 May 2019 20:55    TPro  10.3<H>  /  Alb  3.9  /  TBili  0.4  /  DBili  x   /  AST  30  /  ALT  20  /  AlkPhos  72              Neuro Imaging:  < from: CT Head No Cont (19 @ 21:49) >  indings:     The ventricles, basal cisterns and sulcal pattern are within normal   limits for the patient's stated age.      Thegray-white matter differentiation is preserved.    There is no acute mass effect, midline shift or intracranial hemorrhage.      There is near complete opacification of the left maxillary sinus. The   bilateral mastoid air cells are well aerated.    No evidence of a depressed skull fracture.      IMPRESSION:     No evidence of intracranial hemorrhage, territorial infarct, or mass   effect.    Left maxillary sinus disease.      < end of copied text >    EEG:     Echo:   Carotid Doppler: N/A  Telemetry:

## 2019-05-30 NOTE — DISCHARGE NOTE PROVIDER - HOSPITAL COURSE
38yo  hx of Chronic Hypertension, gestational DM, HIV, CM IGG positive non compliance with blood pressure medications seen in the ER on  for CP and /100 and d/c'ed with labetolol 200mg BID and Procardia XL 60mg daily. She went to her her PMD and her /176 and was switched to hydralazine 25mg TID and Norvasc 5mg and was asked to come to the hospital for R sided facial paralysis that started around 1330 yesterday.            # Hypertensive Emergency due to noncompliance complicated by [place] Palsy r/o CVA, less likely PRESS     - /117 upon presentation s/p labetolol 10mg IV, Hydralazine 25mg IV and norvasc 5mg and presently 197/88    - Less due to noncompliance K- wnl, less likely Primary hyperaldo, no signs of pheo, not cushingoid appearing pt    - Check TSH    - Restart home medications labetolol 200mg BID and Procardia XL 60mg BID    - Check UA for proteinuria     -Admit to telemetry    -N/c mri brain    -MRA H/N    -BP control    -echo     -lipid profile and hbaic    -Start prednisone 60mg q day x 1 week with quick taper there on    -Artificial tears prn to the right eye    -Neuro attending note will follow            # Right sided LMN [place] Palsy    - c/w Steroid 60mg x 1 weeks per neuro recs    - c/w artifical tears for rt eye         # Hyperproteinemia    - Previously 8.7 and presently 10.3    - Previous Serum Electrophoresis showed -Weak Gamma-Migrating Paraprotein Identified     - Check Serum, Urine Electrophoresis     - DDX Monoclonal vs polyclonal disease  ? MGUS        # HIV    - HIV positive     - CD4 count -484 ()     -She did not want to discuss her HIV status infront of her partner. Per pharmacy she hasnt picked up her HAART meds since December, but states she is compliant with her meds     - c/w raltegravir 400mg BID,  Descovy 200mg/ 25mg once a day.         # DVT- lovenox       GI - not indicated            Pt explained the consequenses of leaving including death . But adamant to leave since she has 4 kids to take . AMA will be signed

## 2019-05-30 NOTE — H&P ADULT - NSICDXPASTMEDICALHX_GEN_ALL_CORE_FT
PAST MEDICAL HISTORY:  Gestational diabetes     Gestational hypertension     HIV (human immunodeficiency virus infection)

## 2019-05-30 NOTE — DISCHARGE NOTE NURSING/CASE MANAGEMENT/SOCIAL WORK - NSDCPEPTSTRK_GEN_ALL_CORE
Need for follow up after discharge/Stroke education booklet/Prescribed medications/Risk factors for stroke/Call 911 for stroke/Stroke support groups for patients, families, and friends/Stroke warning signs and symptoms/Signs and symptoms of stroke

## 2019-05-30 NOTE — DISCHARGE NOTE PROVIDER - NSDCCPCAREPLAN_GEN_ALL_CORE_FT
PRINCIPAL DISCHARGE DIAGNOSIS  Diagnosis: Facial paralysis with forehead involvement  Assessment and Plan of Treatment: S/p eval by neuro, hypertensive emergency , pt wants to leave [facility]      SECONDARY DISCHARGE DIAGNOSES  Diagnosis: Tongue deviation  Assessment and Plan of Treatment: S/p eval by neuro, hypertensive emergency , pt wants to leave [facility]    Diagnosis: Uncontrolled hypertension  Assessment and Plan of Treatment: S/p eval by neuro, hypertensive emergency , pt wants to leave [facility]

## 2019-05-30 NOTE — CONSULT NOTE ADULT - ASSESSMENT
38yo right handed female with h/o Heart murmur,c- section and uncontrolled HTN (states usual in 180's-200's) since pregnancy, delivered past December, presents with HTN and right  facial drooping which started acutely at 12noon yesterday. Patient reports high SBP in the 200s and non compliance with bp medication since past 12 days as it ran out.      DDX :   -Right [place] palsy  -Due to the Associated symptoms of intermittent blurry vision , slurred speech and persistent high bp in 200s at home and in ED, CVA could also be a possibility  -Hypertensive urgency        Plan  -Admit to telemetry  -N/c mri brain  -MRA H/N  -BP control  -echo   -lipid profile and hbaic  -Start prednisone 60mg q day x 1 week with quick taper there on  -Artificial tears prn to the right eye  -Neuro attending note will follow

## 2019-05-31 DIAGNOSIS — Z71.89 OTHER SPECIFIED COUNSELING: ICD-10-CM

## 2019-06-01 LAB
B BURGDOR C6 AB SER-ACNC: NEGATIVE — SIGNIFICANT CHANGE UP
B BURGDOR IGG+IGM SER-ACNC: 0.18 INDEX — SIGNIFICANT CHANGE UP (ref 0.01–0.89)
LYME C6 AB IGG/IGM EIA REFLEX WESTERN BL: SIGNIFICANT CHANGE UP

## 2019-06-03 LAB
HSV1 AB FLD QL: SIGNIFICANT CHANGE UP TITER
HSV2 AB FLD-ACNC: SIGNIFICANT CHANGE UP TITER

## 2019-06-04 DIAGNOSIS — R47.81 SLURRED SPEECH: ICD-10-CM

## 2019-06-04 DIAGNOSIS — E66.9 OBESITY, UNSPECIFIED: ICD-10-CM

## 2019-06-04 DIAGNOSIS — I10 ESSENTIAL (PRIMARY) HYPERTENSION: ICD-10-CM

## 2019-06-04 DIAGNOSIS — Z98.891 HISTORY OF UTERINE SCAR FROM PREVIOUS SURGERY: ICD-10-CM

## 2019-06-04 DIAGNOSIS — Z21 ASYMPTOMATIC HUMAN IMMUNODEFICIENCY VIRUS [HIV] INFECTION STATUS: ICD-10-CM

## 2019-06-04 DIAGNOSIS — Z82.49 FAMILY HISTORY OF ISCHEMIC HEART DISEASE AND OTHER DISEASES OF THE CIRCULATORY SYSTEM: ICD-10-CM

## 2019-06-04 DIAGNOSIS — K14.8 OTHER DISEASES OF TONGUE: ICD-10-CM

## 2019-06-04 DIAGNOSIS — H53.8 OTHER VISUAL DISTURBANCES: ICD-10-CM

## 2019-06-04 DIAGNOSIS — Z83.3 FAMILY HISTORY OF DIABETES MELLITUS: ICD-10-CM

## 2019-06-04 DIAGNOSIS — G51.0 BELL'S PALSY: ICD-10-CM

## 2019-06-04 DIAGNOSIS — I16.0 HYPERTENSIVE URGENCY: ICD-10-CM

## 2019-06-04 DIAGNOSIS — E11.9 TYPE 2 DIABETES MELLITUS WITHOUT COMPLICATIONS: ICD-10-CM

## 2019-06-04 DIAGNOSIS — E88.09 OTHER DISORDERS OF PLASMA-PROTEIN METABOLISM, NOT ELSEWHERE CLASSIFIED: ICD-10-CM

## 2019-06-04 DIAGNOSIS — Z91.14 PATIENT'S OTHER NONCOMPLIANCE WITH MEDICATION REGIMEN: ICD-10-CM

## 2019-06-18 ENCOUNTER — OUTPATIENT (OUTPATIENT)
Dept: OUTPATIENT SERVICES | Facility: HOSPITAL | Age: 39
LOS: 1 days | Discharge: HOME | End: 2019-06-18

## 2019-06-18 DIAGNOSIS — I10 ESSENTIAL (PRIMARY) HYPERTENSION: ICD-10-CM

## 2019-06-18 DIAGNOSIS — E66.9 OBESITY, UNSPECIFIED: ICD-10-CM

## 2019-06-18 DIAGNOSIS — D64.9 ANEMIA, UNSPECIFIED: ICD-10-CM

## 2019-06-18 DIAGNOSIS — E78.1 PURE HYPERGLYCERIDEMIA: ICD-10-CM

## 2019-07-27 ENCOUNTER — TRANSCRIPTION ENCOUNTER (OUTPATIENT)
Age: 39
End: 2019-07-27

## 2019-09-24 NOTE — ED PROVIDER NOTE - PROGRESS NOTE DETAILS
all results reviewed with pt. advised pelvic rest f/u with ob. abd soft nt/nd, will dc 38yF  6weeks pregnant by LMP here for crampy epigastric intermittent pain.  Pain is not a/w any fever, chills, vaginal bleeding, dysuria.  ON EXAM: Pt is NAD.  S1 S2 CTAB.  Abd soft, non tender.  No CVA tenderness.    Labs and sono reviewed with pt, pt comfortable following with own OB.  Aware of pelvic rest. No

## 2019-11-01 ENCOUNTER — OUTPATIENT (OUTPATIENT)
Dept: OUTPATIENT SERVICES | Facility: HOSPITAL | Age: 39
LOS: 1 days | Discharge: HOME | End: 2019-11-01

## 2019-11-01 DIAGNOSIS — E78.1 PURE HYPERGLYCERIDEMIA: ICD-10-CM

## 2019-11-01 DIAGNOSIS — I10 ESSENTIAL (PRIMARY) HYPERTENSION: ICD-10-CM

## 2019-11-01 DIAGNOSIS — D72.819 DECREASED WHITE BLOOD CELL COUNT, UNSPECIFIED: ICD-10-CM

## 2019-11-01 DIAGNOSIS — R73.01 IMPAIRED FASTING GLUCOSE: ICD-10-CM

## 2019-11-01 DIAGNOSIS — D64.9 ANEMIA, UNSPECIFIED: ICD-10-CM

## 2019-11-01 DIAGNOSIS — E03.9 HYPOTHYROIDISM, UNSPECIFIED: ICD-10-CM

## 2019-11-28 ENCOUNTER — TRANSCRIPTION ENCOUNTER (OUTPATIENT)
Age: 39
End: 2019-11-28

## 2019-12-24 ENCOUNTER — TRANSCRIPTION ENCOUNTER (OUTPATIENT)
Age: 39
End: 2019-12-24

## 2020-02-16 ENCOUNTER — TRANSCRIPTION ENCOUNTER (OUTPATIENT)
Age: 40
End: 2020-02-16

## 2020-03-04 ENCOUNTER — TRANSCRIPTION ENCOUNTER (OUTPATIENT)
Age: 40
End: 2020-03-04

## 2020-07-05 ENCOUNTER — TRANSCRIPTION ENCOUNTER (OUTPATIENT)
Age: 40
End: 2020-07-05

## 2020-09-04 ENCOUNTER — RESULT REVIEW (OUTPATIENT)
Age: 40
End: 2020-09-04

## 2020-09-04 ENCOUNTER — OUTPATIENT (OUTPATIENT)
Dept: OUTPATIENT SERVICES | Facility: HOSPITAL | Age: 40
LOS: 1 days | Discharge: HOME | End: 2020-09-04
Payer: MEDICAID

## 2020-09-04 DIAGNOSIS — M79.672 PAIN IN LEFT FOOT: ICD-10-CM

## 2020-09-04 DIAGNOSIS — M79.671 PAIN IN RIGHT FOOT: ICD-10-CM

## 2020-09-04 DIAGNOSIS — M54.9 DORSALGIA, UNSPECIFIED: ICD-10-CM

## 2020-09-04 PROCEDURE — 72202 X-RAY EXAM SI JOINTS 3/> VWS: CPT | Mod: 26

## 2020-09-04 PROCEDURE — 73610 X-RAY EXAM OF ANKLE: CPT | Mod: 26,LT,RT

## 2020-09-04 PROCEDURE — 72110 X-RAY EXAM L-2 SPINE 4/>VWS: CPT | Mod: 26

## 2021-05-13 ENCOUNTER — APPOINTMENT (OUTPATIENT)
Dept: CARDIOLOGY | Facility: CLINIC | Age: 41
End: 2021-05-13
Payer: MEDICAID

## 2021-05-13 VITALS
BODY MASS INDEX: 45.99 KG/M2 | SYSTOLIC BLOOD PRESSURE: 140 MMHG | DIASTOLIC BLOOD PRESSURE: 80 MMHG | TEMPERATURE: 98.7 F | WEIGHT: 293 LBS | HEIGHT: 67 IN

## 2021-05-13 VITALS — HEART RATE: 52 BPM

## 2021-05-13 DIAGNOSIS — O24.419 GESTATIONAL DIABETES MELLITUS IN PREGNANCY, UNSPECIFIED CONTROL: ICD-10-CM

## 2021-05-13 DIAGNOSIS — L40.9 PSORIASIS, UNSPECIFIED: ICD-10-CM

## 2021-05-13 PROCEDURE — 99204 OFFICE O/P NEW MOD 45 MIN: CPT

## 2021-05-13 PROCEDURE — 93000 ELECTROCARDIOGRAM COMPLETE: CPT

## 2021-05-13 PROCEDURE — 99072 ADDL SUPL MATRL&STAF TM PHE: CPT

## 2021-05-13 RX ORDER — FERROUS SULFATE 325(65) MG
325 TABLET ORAL
Refills: 0 | Status: ACTIVE | COMMUNITY

## 2021-05-13 RX ORDER — AMLODIPINE BESYLATE 10 MG/1
10 TABLET ORAL DAILY
Refills: 0 | Status: ACTIVE | COMMUNITY

## 2021-05-13 RX ORDER — LEVOTHYROXINE SODIUM 88 UG/1
88 CAPSULE ORAL DAILY
Refills: 0 | Status: ACTIVE | COMMUNITY

## 2021-05-13 RX ORDER — CHLORTHALIDONE 25 MG/1
25 TABLET ORAL DAILY
Refills: 0 | Status: ACTIVE | COMMUNITY

## 2021-05-13 RX ORDER — BLOOD-GLUCOSE METER
EACH MISCELLANEOUS
Qty: 1 | Refills: 0 | Status: DISCONTINUED | COMMUNITY
Start: 2018-11-07 | End: 2021-05-13

## 2021-05-13 RX ORDER — LISINOPRIL 30 MG/1
30 TABLET ORAL DAILY
Refills: 0 | Status: ACTIVE | COMMUNITY

## 2021-05-13 RX ORDER — BLOOD SUGAR DIAGNOSTIC
STRIP MISCELLANEOUS
Qty: 120 | Refills: 5 | Status: DISCONTINUED | COMMUNITY
Start: 2018-11-07 | End: 2021-05-13

## 2021-05-13 RX ORDER — FERROUS SULFATE TAB 325 MG (65 MG ELEMENTAL FE) 325 (65 FE) MG
325 (65 FE) TAB ORAL DAILY
Qty: 30 | Refills: 5 | Status: DISCONTINUED | COMMUNITY
End: 2021-05-13

## 2021-05-13 RX ORDER — PNV/FERROUS SULFATE/FOLIC ACID 27-<0.5MG
TABLET ORAL
Refills: 0 | Status: DISCONTINUED | COMMUNITY
End: 2021-05-13

## 2021-05-13 RX ORDER — LANCETS 33 GAUGE
EACH MISCELLANEOUS
Qty: 120 | Refills: 5 | Status: DISCONTINUED | COMMUNITY
Start: 2018-11-07 | End: 2021-05-13

## 2021-05-13 RX ORDER — LABETALOL HYDROCHLORIDE 200 MG/1
200 TABLET, FILM COATED ORAL
Qty: 30 | Refills: 2 | Status: DISCONTINUED | COMMUNITY
Start: 2018-05-02 | End: 2021-05-13

## 2021-05-13 NOTE — DISCUSSION/SUMMARY
[FreeTextEntry1] : Treadmill stress test\par 2D echo doppler.\par Maintain present cardiac medications.\par Weight reduction\par Start a low fat, low cholesterol , low sodium diet.\par Patient was instructed to target their T. Cholesterol to less than 200 mg/dl and LDL cholesterol to less than 100 mg/dl.\par Exercise and weight loss was advised.\par Patient was advised to have a BMP, CBC , fasting lipid profile and hepatic panel.\par RV in 2 weeks.\par

## 2021-05-13 NOTE — PHYSICAL EXAM
[Well Nourished] : well nourished [No Acute Distress] : no acute distress [Obese] : obese [Normal Conjunctiva] : normal conjunctiva [No Xanthelasma] : no xanthelasma [Normal S1, S2] : normal S1, S2 [No Rub] : no rub [No Gallop] : no gallop [Clear Lung Fields] : clear lung fields [Good Air Entry] : good air entry [No Respiratory Distress] : no respiratory distress  [Soft] : abdomen soft [Non Tender] : non-tender [No Edema] : no edema [No Rash] : no rash [Normal] : moves all extremities, no focal deficits, normal speech [Moves all extremities] : moves all extremities [No Focal Deficits] : no focal deficits [Normal Speech] : normal speech [Alert and Oriented] : alert and oriented [Normal memory] : normal memory [Well Developed] : well developed [Normal Venous Pressure] : normal venous pressure [de-identified] : Grade II/VI systolic murmur over LSB [de-identified] : Multiple tattoos

## 2021-05-13 NOTE — REASON FOR VISIT
[FreeTextEntry1] : Patient presents for initial cardiology evaluation due to a history of hypertension and a heart murmur. She has been experiencing exertional dyspnea and these symptoms have progressed.

## 2021-05-13 NOTE — ASSESSMENT
[FreeTextEntry1] : Hypertension\par Exertional dyspnea\par R/O ASHD\par Hypothyroidism\par Obesity\par Probable mitral/tricuspid regurgitation

## 2021-05-13 NOTE — HISTORY OF PRESENT ILLNESS
[FreeTextEntry1] : History of a heart murmur. Patient does not know the etiology of the murmur.\par Hypertension which started as gestational hypertension and after the patient's fourth pregnancy became essential hypertension and persisted beyond the post partum period.\par Patient denied DM, smoking, history of MI, angina, CHF, arrhythmia, TIA, CVA, syncope\par No familial history of heart disease. Patient's father has Hypertension and is alive on medications. her mother had hypertension and  of liver cirrhosis.\par PSurgHx: 4 C-sections without any complications, patient did have gestational diabetes

## 2021-05-25 ENCOUNTER — APPOINTMENT (OUTPATIENT)
Dept: CARDIOLOGY | Facility: CLINIC | Age: 41
End: 2021-05-25

## 2021-05-27 ENCOUNTER — APPOINTMENT (OUTPATIENT)
Dept: CARDIOLOGY | Facility: CLINIC | Age: 41
End: 2021-05-27

## 2021-06-11 ENCOUNTER — APPOINTMENT (OUTPATIENT)
Dept: CARDIOLOGY | Facility: CLINIC | Age: 41
End: 2021-06-11

## 2021-06-18 ENCOUNTER — APPOINTMENT (OUTPATIENT)
Dept: CARDIOLOGY | Facility: CLINIC | Age: 41
End: 2021-06-18

## 2021-06-25 ENCOUNTER — APPOINTMENT (OUTPATIENT)
Dept: CARDIOLOGY | Facility: CLINIC | Age: 41
End: 2021-06-25

## 2021-07-02 ENCOUNTER — APPOINTMENT (OUTPATIENT)
Dept: CARDIOLOGY | Facility: CLINIC | Age: 41
End: 2021-07-02
Payer: MEDICAID

## 2021-07-02 PROCEDURE — 93306 TTE W/DOPPLER COMPLETE: CPT

## 2021-07-15 ENCOUNTER — APPOINTMENT (OUTPATIENT)
Dept: CARDIOLOGY | Facility: CLINIC | Age: 41
End: 2021-07-15
Payer: MEDICAID

## 2021-07-15 PROCEDURE — 93015 CV STRESS TEST SUPVJ I&R: CPT

## 2021-07-19 NOTE — ED PROVIDER NOTE - CARDIAC, MLM
The following treatment recommendations and plan were discussed in detail with Bernadine Chávez. Adjuvants: In light of the presence of a neuropathic component of pain and side effects to Lyrica, I have increased his Keppra to 500 mg twice a day. For continued chronic pain with associated musculoskeletal component, we will continue his Baclofen to 20 mg three times a day. Multidisciplinary Pain Management:   In the presence of complex, chronic, and multi-factorial pain, the importance of a multidisciplinary approach to pain management in the patients management regimen was emphasized and discussed in great detail. PHYSICAL THERAPY: I advised the patient to continue gentle stretching atleast 4 times a day.     Referrals:  None    Prescriptions Written This Visit:   Keppra 500 mg    Follow-up: 12 weeks
Normal rate, regular rhythm.  Heart sounds S1, S2.  No murmurs, rubs or gallops.

## 2021-08-23 ENCOUNTER — OUTPATIENT (OUTPATIENT)
Dept: OUTPATIENT SERVICES | Facility: HOSPITAL | Age: 41
LOS: 1 days | Discharge: HOME | End: 2021-08-23

## 2021-08-23 ENCOUNTER — APPOINTMENT (OUTPATIENT)
Dept: PODIATRY | Facility: CLINIC | Age: 41
End: 2021-08-23
Payer: MEDICAID

## 2021-08-23 DIAGNOSIS — M19.071 PRIMARY OSTEOARTHRITIS, RIGHT ANKLE AND FOOT: ICD-10-CM

## 2021-08-23 DIAGNOSIS — M19.072 PRIMARY OSTEOARTHRITIS, RIGHT ANKLE AND FOOT: ICD-10-CM

## 2021-08-23 PROCEDURE — 99203 OFFICE O/P NEW LOW 30 MIN: CPT

## 2021-08-23 RX ORDER — INDOMETHACIN 25 MG/1
25 CAPSULE ORAL 3 TIMES DAILY
Qty: 15 | Refills: 0 | Status: ACTIVE | COMMUNITY
Start: 2021-08-23 | End: 1900-01-01

## 2021-08-25 NOTE — ASSESSMENT
[FreeTextEntry1] : foot pain may secondary to inflammatory process of psoriatic arthritis--> pt should follow up with her rheumatologist\par may benefit from orthotics w/ heel lifts\par Rx indomethacin \par follow up 1 month

## 2021-08-25 NOTE — ED ADULT TRIAGE NOTE - NS ED NURSE BANDS TYPE
[Use of Plain Language] : use of plain language [Adequate] : adequate [None] : none [] : I have reviewed management goals with caretaker and provided a copy of care plan Name band;

## 2021-08-25 NOTE — HISTORY OF PRESENT ILLNESS
[FreeTextEntry1] : 40 y/o female presents w/ bilateral foot pain. Pain started 5 days; exacerbated with weigthbearing. no h/o of trauma. Reports history of psoriatic arthritis 74 yo F with oprior trach epilepsy with AMS  x 12 hrs   aphasic  not interactive  localizes pain  Ct head neg ? seizure  will asim  stroke team and june cardenas 72 yo F with oprior trach epilepsy with AMS  x 12 hrs   aphasic  not interactive  localizes pain  Ct head neg ? seizure  will dw  stroke team and dr june watkins wants to admit pt to his service

## 2021-08-25 NOTE — PHYSICAL EXAM
[General Appearance - Alert] : alert [General Appearance - In No Acute Distress] : in no acute distress [2+] : left foot dorsalis pedis 2+ [Oriented To Time, Place, And Person] : oriented to person, place, and time [Impaired Insight] : insight and judgment were intact [de-identified] : global foot pain of the right foot. tender on palpation. Left heel pain on palpation

## 2021-08-31 DIAGNOSIS — M19.071 PRIMARY OSTEOARTHRITIS, RIGHT ANKLE AND FOOT: ICD-10-CM

## 2021-08-31 DIAGNOSIS — M79.671 PAIN IN RIGHT FOOT: ICD-10-CM

## 2021-09-02 ENCOUNTER — APPOINTMENT (OUTPATIENT)
Dept: OBGYN | Facility: CLINIC | Age: 41
End: 2021-09-02

## 2021-09-20 ENCOUNTER — APPOINTMENT (OUTPATIENT)
Dept: PODIATRY | Facility: CLINIC | Age: 41
End: 2021-09-20
Payer: MEDICAID

## 2021-09-20 ENCOUNTER — OUTPATIENT (OUTPATIENT)
Dept: OUTPATIENT SERVICES | Facility: HOSPITAL | Age: 41
LOS: 1 days | Discharge: HOME | End: 2021-09-20

## 2021-09-20 DIAGNOSIS — M79.671 PAIN IN RIGHT FOOT: ICD-10-CM

## 2021-09-20 DIAGNOSIS — M79.672 PAIN IN RIGHT FOOT: ICD-10-CM

## 2021-09-20 DIAGNOSIS — M72.2 PLANTAR FASCIAL FIBROMATOSIS: ICD-10-CM

## 2021-09-20 PROCEDURE — 99212 OFFICE O/P EST SF 10 MIN: CPT

## 2021-09-21 PROBLEM — M72.2 BILATERAL PLANTAR FASCIITIS: Status: ACTIVE | Noted: 2021-09-21

## 2021-09-21 PROBLEM — M79.671 PAIN IN BOTH FEET: Status: ACTIVE | Noted: 2021-08-25

## 2021-09-21 NOTE — HISTORY OF PRESENT ILLNESS
[FreeTextEntry1] : 42 y/o female presents w/ bilateral foot pain. Pain started 5 days; exacerbated with weigthbearing. no h/o of trauma. Reports history of psoriatic arthritis\par 9/21-returns today with continued heel pain. patient was started on prednisone 3 days ago and notes some improvement in heel pain

## 2021-09-21 NOTE — ASSESSMENT
[FreeTextEntry1] : pt improving with prednisone\par will hold of on injections for now\par may benefit from orthotics w/ heel lifts\par f/u one month\par

## 2021-09-21 NOTE — PHYSICAL EXAM
[General Appearance - Alert] : alert [General Appearance - In No Acute Distress] : in no acute distress [2+] : left foot dorsalis pedis 2+ [Oriented To Time, Place, And Person] : oriented to person, place, and time [Impaired Insight] : insight and judgment were intact [de-identified] : global foot pain of the right foot. tender on palpation. Left heel pain on palpation

## 2021-09-30 ENCOUNTER — APPOINTMENT (OUTPATIENT)
Dept: PODIATRY | Facility: CLINIC | Age: 41
End: 2021-09-30

## 2021-09-30 ENCOUNTER — OUTPATIENT (OUTPATIENT)
Dept: OUTPATIENT SERVICES | Facility: HOSPITAL | Age: 41
LOS: 1 days | Discharge: HOME | End: 2021-09-30

## 2021-09-30 DIAGNOSIS — Z02.9 ENCOUNTER FOR ADMINISTRATIVE EXAMINATIONS, UNSPECIFIED: ICD-10-CM

## 2021-10-28 ENCOUNTER — OUTPATIENT (OUTPATIENT)
Dept: OUTPATIENT SERVICES | Facility: HOSPITAL | Age: 41
LOS: 1 days | Discharge: HOME | End: 2021-10-28

## 2021-10-28 ENCOUNTER — APPOINTMENT (OUTPATIENT)
Dept: OBGYN | Facility: CLINIC | Age: 41
End: 2021-10-28
Payer: MEDICAID

## 2021-10-28 VITALS
SYSTOLIC BLOOD PRESSURE: 120 MMHG | HEIGHT: 67 IN | DIASTOLIC BLOOD PRESSURE: 70 MMHG | WEIGHT: 293 LBS | BODY MASS INDEX: 45.99 KG/M2

## 2021-10-28 DIAGNOSIS — Z01.419 ENCOUNTER FOR GYNECOLOGICAL EXAMINATION (GENERAL) (ROUTINE) W/OUT ABNORMAL FINDINGS: ICD-10-CM

## 2021-10-28 DIAGNOSIS — Z01.419 ENCOUNTER FOR GYNECOLOGICAL EXAMINATION (GENERAL) (ROUTINE) WITHOUT ABNORMAL FINDINGS: ICD-10-CM

## 2021-10-28 PROCEDURE — 99396 PREV VISIT EST AGE 40-64: CPT

## 2021-10-28 NOTE — PHYSICAL EXAM
[Appropriately responsive] : appropriately responsive [Alert] : alert [No Acute Distress] : no acute distress [No Lymphadenopathy] : no lymphadenopathy [Regular Rate Rhythm] : regular rate rhythm [No Murmurs] : no murmurs [Clear to Auscultation B/L] : clear to auscultation bilaterally [Soft] : soft [Non-tender] : non-tender [Non-distended] : non-distended [No HSM] : No HSM [No Lesions] : no lesions [No Mass] : no mass [Oriented x3] : oriented x3 [FreeTextEntry7] : obese [Examination Of The Breasts] : a normal appearance [No Masses] : no breast masses were palpable [Labia Majora] : normal [Labia Minora] : normal [Normal] : normal [Uterine Adnexae] : normal

## 2021-10-28 NOTE — DISCUSSION/SUMMARY
[FreeTextEntry1] : 40yo P4 for annual exam\par -pap/hpv done\par -mammo pending\par -STD screening\par -f/u in 1 year or sooner PRN

## 2021-10-28 NOTE — HISTORY OF PRESENT ILLNESS
[FreeTextEntry1] : 40yo P4 here for annual exam w/o complaints\par had c/s BTL in 2018\par has mammo scheduled for this Saturday\par Pt reporting some GI complaints when moving certain ways, no pelvic pain/complaints\par Desiring STD screening

## 2021-11-01 LAB
A VAGINAE DNA VAG QL NAA+PROBE: ABNORMAL
BVAB2 DNA VAG QL NAA+PROBE: NORMAL
C KRUSEI DNA VAG QL NAA+PROBE: NEGATIVE
C TRACH RRNA SPEC QL NAA+PROBE: NEGATIVE
MEGA1 DNA VAG QL NAA+PROBE: ABNORMAL
N GONORRHOEA RRNA SPEC QL NAA+PROBE: NEGATIVE
T VAGINALIS RRNA SPEC QL NAA+PROBE: NEGATIVE

## 2021-11-05 LAB — HPV HIGH+LOW RISK DNA PNL CVX: NOT DETECTED

## 2021-11-07 ENCOUNTER — LABORATORY RESULT (OUTPATIENT)
Age: 41
End: 2021-11-07

## 2021-11-10 ENCOUNTER — RESULT REVIEW (OUTPATIENT)
Age: 41
End: 2021-11-10

## 2021-11-10 ENCOUNTER — OUTPATIENT (OUTPATIENT)
Dept: OUTPATIENT SERVICES | Facility: HOSPITAL | Age: 41
LOS: 1 days | Discharge: HOME | End: 2021-11-10
Payer: MEDICARE

## 2021-11-10 DIAGNOSIS — R07.9 CHEST PAIN, UNSPECIFIED: ICD-10-CM

## 2021-11-10 PROCEDURE — G1004: CPT

## 2021-11-10 PROCEDURE — 93016 CV STRESS TEST SUPVJ ONLY: CPT

## 2021-11-10 PROCEDURE — 93018 CV STRESS TEST I&R ONLY: CPT

## 2021-11-10 PROCEDURE — 78452 HT MUSCLE IMAGE SPECT MULT: CPT | Mod: 26,ME

## 2021-11-10 RX ORDER — ADENOSINE 3 MG/ML
60 INJECTION INTRAVENOUS ONCE
Refills: 0 | Status: DISCONTINUED | OUTPATIENT
Start: 2021-11-10 | End: 2021-11-24

## 2021-11-13 LAB — CYTOLOGY CVX/VAG DOC THIN PREP: NORMAL

## 2021-11-15 ENCOUNTER — APPOINTMENT (OUTPATIENT)
Dept: PODIATRY | Facility: CLINIC | Age: 41
End: 2021-11-15

## 2021-11-16 ENCOUNTER — APPOINTMENT (OUTPATIENT)
Dept: CARDIOLOGY | Facility: CLINIC | Age: 41
End: 2021-11-16
Payer: MEDICAID

## 2021-11-30 ENCOUNTER — APPOINTMENT (OUTPATIENT)
Dept: CARDIOLOGY | Facility: CLINIC | Age: 41
End: 2021-11-30
Payer: MEDICAID

## 2021-11-30 DIAGNOSIS — R01.1 CARDIAC MURMUR, UNSPECIFIED: ICD-10-CM

## 2021-11-30 DIAGNOSIS — R94.39 ABNORMAL RESULT OF OTHER CARDIOVASCULAR FUNCTION STUDY: ICD-10-CM

## 2021-11-30 PROCEDURE — 93000 ELECTROCARDIOGRAM COMPLETE: CPT

## 2021-11-30 PROCEDURE — 99214 OFFICE O/P EST MOD 30 MIN: CPT

## 2021-11-30 NOTE — REASON FOR VISIT
[FreeTextEntry1] : Patient presents for follow up to review her nuclear stress thallium and echo doppler results.

## 2021-11-30 NOTE — DISCUSSION/SUMMARY
[FreeTextEntry1] : Thallium stress test and 2D echo doppler results were reviewed with the patient. Option to proceed with a cardiac catheterization was discussed with the patient in detail. Copies of her test results were provided to her.\par All the risks and benefits of the procedure were explained in detail including but not limited to death, MI, CVA, arrhythmia, TIA, CVA, bleeding, infection, nephropathy, emergency surgery.\par Patient understands and consents for the procedure.\par Start ASA 81 mg QD.\par Maintain present cardiac medications.\par Weight reduction\par Start a low fat, low cholesterol , low sodium diet.\par Patient was instructed to target their T. Cholesterol to less than 200 mg/dl and LDL cholesterol to less than 100 mg/dl.\par Exercise and weight loss was advised.\par RV in 2 weeks.\par

## 2021-11-30 NOTE — PHYSICAL EXAM
[Well Developed] : well developed [Well Nourished] : well nourished [No Acute Distress] : no acute distress [Obese] : obese [Normal Conjunctiva] : normal conjunctiva [No Xanthelasma] : no xanthelasma [Normal Venous Pressure] : normal venous pressure [Normal S1, S2] : normal S1, S2 [No Rub] : no rub [No Gallop] : no gallop [Clear Lung Fields] : clear lung fields [Good Air Entry] : good air entry [No Respiratory Distress] : no respiratory distress  [Soft] : abdomen soft [Non Tender] : non-tender [No Edema] : no edema [No Rash] : no rash [Normal] : moves all extremities, no focal deficits, normal speech [Moves all extremities] : moves all extremities [No Focal Deficits] : no focal deficits [Normal Speech] : normal speech [Alert and Oriented] : alert and oriented [Normal memory] : normal memory [de-identified] : Grade II/VI systolic murmur over LSB [de-identified] : Multiple tattoos

## 2021-11-30 NOTE — ASSESSMENT
[FreeTextEntry1] : Hypertension\par Exertional dyspnea\par Abnormal thallium stress test revealing a moderate sized defect\par Hypothyroidism\par Obesity\par Mild mitral/tricuspid regurgitation

## 2021-12-13 ENCOUNTER — LABORATORY RESULT (OUTPATIENT)
Age: 41
End: 2021-12-13

## 2021-12-15 ENCOUNTER — OUTPATIENT (OUTPATIENT)
Dept: OUTPATIENT SERVICES | Facility: HOSPITAL | Age: 41
LOS: 1 days | Discharge: HOME | End: 2021-12-15
Payer: MEDICAID

## 2021-12-15 VITALS
SYSTOLIC BLOOD PRESSURE: 154 MMHG | TEMPERATURE: 98 F | OXYGEN SATURATION: 100 % | RESPIRATION RATE: 16 BRPM | DIASTOLIC BLOOD PRESSURE: 71 MMHG | HEART RATE: 60 BPM

## 2021-12-15 LAB
ANION GAP SERPL CALC-SCNC: 13 MMOL/L — SIGNIFICANT CHANGE UP (ref 7–14)
BUN SERPL-MCNC: 6 MG/DL — LOW (ref 10–20)
CALCIUM SERPL-MCNC: 8.5 MG/DL — SIGNIFICANT CHANGE UP (ref 8.5–10.1)
CHLORIDE SERPL-SCNC: 102 MMOL/L — SIGNIFICANT CHANGE UP (ref 98–110)
CO2 SERPL-SCNC: 22 MMOL/L — SIGNIFICANT CHANGE UP (ref 17–32)
CREAT SERPL-MCNC: 0.9 MG/DL — SIGNIFICANT CHANGE UP (ref 0.7–1.5)
GLUCOSE SERPL-MCNC: 83 MG/DL — SIGNIFICANT CHANGE UP (ref 70–99)
HCT VFR BLD CALC: 36.8 % — LOW (ref 37–47)
HGB BLD-MCNC: 12.1 G/DL — SIGNIFICANT CHANGE UP (ref 12–16)
MCHC RBC-ENTMCNC: 28.1 PG — SIGNIFICANT CHANGE UP (ref 27–31)
MCHC RBC-ENTMCNC: 32.9 G/DL — SIGNIFICANT CHANGE UP (ref 32–37)
MCV RBC AUTO: 85.4 FL — SIGNIFICANT CHANGE UP (ref 81–99)
NRBC # BLD: 0 /100 WBCS — SIGNIFICANT CHANGE UP (ref 0–0)
PLATELET # BLD AUTO: 232 K/UL — SIGNIFICANT CHANGE UP (ref 130–400)
POTASSIUM SERPL-MCNC: 3.6 MMOL/L — SIGNIFICANT CHANGE UP (ref 3.5–5)
POTASSIUM SERPL-SCNC: 3.6 MMOL/L — SIGNIFICANT CHANGE UP (ref 3.5–5)
RBC # BLD: 4.31 M/UL — SIGNIFICANT CHANGE UP (ref 4.2–5.4)
RBC # FLD: 14.6 % — HIGH (ref 11.5–14.5)
SODIUM SERPL-SCNC: 137 MMOL/L — SIGNIFICANT CHANGE UP (ref 135–146)
WBC # BLD: 4.86 K/UL — SIGNIFICANT CHANGE UP (ref 4.8–10.8)
WBC # FLD AUTO: 4.86 K/UL — SIGNIFICANT CHANGE UP (ref 4.8–10.8)

## 2021-12-15 PROCEDURE — 93458 L HRT ARTERY/VENTRICLE ANGIO: CPT | Mod: 26

## 2021-12-15 NOTE — H&P CARDIOLOGY - HISTORY OF PRESENT ILLNESS
Patient is a 41y Female who presents to the cardiology department for [facility].       Pre cath note:    indication:  [ ] STEMI                [ ] NSTEMI                 [ ] Acute coronary syndrome                     [ ]Unstable Angina   [ ] high risk  [ ] intermediate risk  [ ] low risk                     [ x] Stable Angina     non-invasive testing:                          Date:       11/10/21              result: [ x] high risk  [ ] intermediate risk  [ ] low risk    Anti- Anginal medications:                    [ ] not used                       [x ] used                   [ ] not used but strong indication not to use    Ejection Fraction                   [ ] <29            [ ] 30-39%   [ ] 40-49%     [ ]>50%    CHF                   [ ] active (within last 14 days on meds   [ ] Chronic (on meds but no exacerbation)    COPD                   [ ] mild (on chronic bronchodilators)  [ ] moderate (on chronic steroid therapy)      [ ] severe (indication for home O2 or PACO2 >50)    Other risk factors:                       [ ] Previous MI                     [ ] CVA/ stroke                    [ ] carotid stent/ CEA                    [ ] PVD/PAD- (arterial aneurysm, non-palpable pulses, tortuous vessel with inability to insert catheter, infra-renal dissection, renal or subclavian artery stenosis)                    [ ] diabetic                    [ ] previous CABG                    [ ] Renal Failure       Adjusted Bleeding Score: 2.0%     SUBJ:  40 y/o female presents for [facility] with complaint of chest pain intermittently.  Denies any SOB, no palpitations Had out patient NM stress 11/10/21.   NM Nuclear Stress Pharmacologic Multiple (11.10.21 @ 13:00)   PROCEDURE DATE:  11/10/2021            INTERPRETATION:  ADENOSINE  AND REST MYOCARDIAL PERFUSION SPECT TOMOGRAPHY, WALL MOTION ANALYSIS, LVEF CALCULATION  Reason: Chest pain, abnormal EKG  8 Millicuries 99m technetium sestamibi was injected intravenously at rest,and SPECT myocardial perfusion images were acquired over a 180 degree arc.  Then, adenosine infusion was begun by Cardiology, and at the end of infusion, 25millicuries 99m technetium sestamibi was injected intravenously, and SPECT myocardial perfusion images were acquired in the same manner as the resting study.  On viewing a cinematic display of the planar images, no significant patient motion  On viewing the tomographic images in color and black and white, bullseye polar map, and three-dimensional surface reconstruction, moderate-sized reversible defect in the anterior wall of the left ventricle consistent with ischemia. This is best demonstrated on short axis views 7-10. This iscorroborated on quantified and naked bulls eye data as well as 3-D reconstructions. SDS score of 9.   The adenosine study was acquired as a gated study.  On viewing a cinematic display of the frames,  there is normal wall motion and wall thickening.  Analysis of the ventriculogram generates a calculated left ventricular ejection fraction of   74 % which is normal. Wall motion analysis suggests ejection fraction closer to 65%.    Impression:   Moderate size reversible defect in the anterior wall of the left ventricle consistent with ischemia.  Normal wall motion and wall thickening.  Calculated left ventricular ejection fraction of 74% which is normal. Wall motion analysis suggests ejection fraction closer to 65%.      Past Medical, Past Surgical, and Family History:  PAST MEDICAL HISTORY:  Gestational diabetes     Gestational hypertension     HIV (human immunodeficiency virus infection).     PAST SURGICAL HISTORY:  No significant past surgical history.     FAMILY HISTORY:  No pertinent family history in first degree relatives.     No Pertinent Family History in first degree relatives of: MOm- HTN,DM  Dad- HTN.          REVIEW OF SYSTEMS:  CONSTITUTIONAL: No fever, weight loss, or fatigue  CARDIOLOGY: Patient denies chest pain, shortness of breath or syncopal episodes.   RESPIRATORY: denies shortness of breath, wheezing.   NEUROLOGICAL: NO weakness, no focal deficits to report.  GI: no BRBPR, no N,V diarrhea.     PHYSICAL EXAM:  · CONSTITUTIONAL:	Well-developed, well nourished     · RESPIRATORY:   airway patent; breath sounds equal; good air movement; respirations non-labored; clear to auscultation bilaterally; no chest wall tenderness; no intercostal retractions; no rales,rhonchi or wheeze  · CARDIOVASCULAR	regular rate and rhythm  no rub  no murmur    · EXTREMITIES: No cyanosis, clubbing or edema  · VASCULAR: 	Equal and normal pulses (carotid, femoral, dorsalis pedis)  	  Bertrand Test WNL

## 2021-12-15 NOTE — CHART NOTE - NSCHARTNOTEFT_GEN_A_CORE
PRE-OP DIAGNOSIS:  Positive Stress test      PROCEDURE:     [X] Coronary Angiogram     [X] LHC     [] LVG     [] RHC     [] Intervention (see below)         PHYSICIAN:  Dr. Tello    ASSISTANT:  Dr Junior Armstrong       PROCEDURE DESCRIPTION:     Consent:      [X] Patient     [] Family Member     []  Used        Anesthesia:     [X] General     [] Sedation     [X] Local        Access & Closure:     [X] Fr Radial Artery     [] Fr Femoral Artery     [] Fr Femoral Vein     [] Fr Brachial Vein       IV Contrast: 50 mL        Intervention: None      Implants: None       FINDINGS:     Coronary Dominance: Right dominate      LM: No significant disease     LAD: No significant disease     CX: No significant disease     RCA: No significant disease       LVEDP: 23 mmHg     EF: 60 %        ESTIMATED BLOOD LOSS: < 10 mL        CONDITION:     [X] Good     [] Fair     [] Critical        SPECIMEN REMOVED: N/A       POST-OP DIAGNOSIS:      [X] Normal Coronary Angiogram     [] Mild Coronary Artery Disease (< 50% stenosis)     [] __ Vessel Coronary Artery Disease        PLAN OF CARE:     [X] D/C Home Today     [] Return to In-patient bed     [] Admit for observation     [] Return for Staged Procedure     [] CT Surgery Consult     [] Medications:     [] IV Fluids: PRE-OP DIAGNOSIS:  Positive Nuclear Stress test, Chest pain.      PROCEDURE:     [X] Coronary Angiogram     [X] LHC     [] LVG     [] RHC     [] Intervention (see below)         PHYSICIAN:  Dr. Omer ENGLISH formerly [facility]    ASSISTANT:  Dr Junior Armstrong       PROCEDURE DESCRIPTION:     Consent:      [X] Patient     [] Family Member     []  Used        Anesthesia:     [X] General     [] Sedation     [X] Local        Access & Closure:     [X] Fr Radial Artery     [] Fr Femoral Artery     [] Fr Femoral Vein     [] Fr Brachial Vein       IV Contrast: 50 mL        Intervention: None      Implants: None       FINDINGS:     Coronary Dominance: Right dominate      LM: No significant disease     LAD: No significant disease     CX: No significant disease     RCA: No significant disease       LVEDP: 23 mmHg     EF: 60 %        ESTIMATED BLOOD LOSS: < 10 mL        CONDITION:     [X] Good     [] Fair     [] Critical        SPECIMEN REMOVED: N/A       POST-OP DIAGNOSIS:      [X] Normal Coronary Angiogram     [] Mild Coronary Artery Disease (< 50% stenosis)     [] __ Vessel Coronary Artery Disease        PLAN OF CARE:     [X] D/C Home Today     [] Return to In-patient bed     [] Admit for observation     [] Return for Staged Procedure     [] CT Surgery Consult     [] Medications:     [] IV Fluids:

## 2021-12-21 DIAGNOSIS — E78.5 HYPERLIPIDEMIA, UNSPECIFIED: ICD-10-CM

## 2021-12-21 DIAGNOSIS — I20.8 OTHER FORMS OF ANGINA PECTORIS: ICD-10-CM

## 2021-12-21 DIAGNOSIS — R01.1 CARDIAC MURMUR, UNSPECIFIED: ICD-10-CM

## 2021-12-21 DIAGNOSIS — I10 ESSENTIAL (PRIMARY) HYPERTENSION: ICD-10-CM

## 2021-12-21 DIAGNOSIS — Z82.49 FAMILY HISTORY OF ISCHEMIC HEART DISEASE AND OTHER DISEASES OF THE CIRCULATORY SYSTEM: ICD-10-CM

## 2021-12-21 DIAGNOSIS — Z21 ASYMPTOMATIC HUMAN IMMUNODEFICIENCY VIRUS [HIV] INFECTION STATUS: ICD-10-CM

## 2022-02-02 ENCOUNTER — APPOINTMENT (OUTPATIENT)
Dept: CARDIOLOGY | Facility: CLINIC | Age: 42
End: 2022-02-02

## 2022-04-07 ENCOUNTER — APPOINTMENT (OUTPATIENT)
Dept: CARDIOLOGY | Facility: CLINIC | Age: 42
End: 2022-04-07
Payer: MEDICAID

## 2022-04-07 VITALS
SYSTOLIC BLOOD PRESSURE: 140 MMHG | WEIGHT: 293 LBS | HEIGHT: 67 IN | TEMPERATURE: 98.2 F | HEART RATE: 77 BPM | BODY MASS INDEX: 45.99 KG/M2 | DIASTOLIC BLOOD PRESSURE: 90 MMHG

## 2022-04-07 DIAGNOSIS — Z00.00 ENCOUNTER FOR GENERAL ADULT MEDICAL EXAMINATION W/OUT ABNORMAL FINDINGS: ICD-10-CM

## 2022-04-07 DIAGNOSIS — I10 ESSENTIAL (PRIMARY) HYPERTENSION: ICD-10-CM

## 2022-04-07 DIAGNOSIS — U07.1 COVID-19: ICD-10-CM

## 2022-04-07 PROCEDURE — 93000 ELECTROCARDIOGRAM COMPLETE: CPT

## 2022-04-07 PROCEDURE — 99214 OFFICE O/P EST MOD 30 MIN: CPT

## 2022-04-07 RX ORDER — NAPROXEN 500 MG/1
500 TABLET ORAL
Refills: 0 | Status: ACTIVE | COMMUNITY

## 2022-04-07 NOTE — PHYSICAL EXAM
[Well Developed] : well developed [Well Nourished] : well nourished [No Acute Distress] : no acute distress [Obese] : obese [Normal Conjunctiva] : normal conjunctiva [No Xanthelasma] : no xanthelasma [Normal Venous Pressure] : normal venous pressure [Normal S1, S2] : normal S1, S2 [No Rub] : no rub [No Gallop] : no gallop [Clear Lung Fields] : clear lung fields [Good Air Entry] : good air entry [No Respiratory Distress] : no respiratory distress  [Soft] : abdomen soft [Non Tender] : non-tender [No Edema] : no edema [No Rash] : no rash [Normal] : moves all extremities, no focal deficits, normal speech [Moves all extremities] : moves all extremities [No Focal Deficits] : no focal deficits [Normal Speech] : normal speech [Alert and Oriented] : alert and oriented [Normal memory] : normal memory [Normal Gait] : normal gait [de-identified] : Grade II/VI systolic murmur over LSB [de-identified] : Multiple tattoos

## 2022-04-07 NOTE — ASSESSMENT
[FreeTextEntry1] : Hypertension\par Patent coronaries documented on cardiac catheterization\par Normal LV systolic function\par S/P COVID-19 infection\par Hypothyroidism\par Obesity\par Mild mitral/tricuspid regurgitation

## 2022-04-07 NOTE — REASON FOR VISIT
[FreeTextEntry1] : Patient presents for follow up since her cardiac catheterization in 12/21. She has since had COVID infection after a family get together. She denies any cardiac symptoms. Her cardiac catheterization revealed normal coronaries with Normal LV systolic function.

## 2022-04-07 NOTE — DISCUSSION/SUMMARY
[FreeTextEntry1] : Medical therapy.\par Maintain present cardiac medications.\par Weight reduction\par Start a low fat, low cholesterol , low sodium diet.\par Patient was instructed to target her T. Cholesterol to less than 200 mg/dl and LDL cholesterol to less than 100 mg/dl.\par Exercise and weight loss was advised.\par RV in 6 months\par

## 2022-08-30 NOTE — HISTORY OF PRESENT ILLNESS
[FreeTextEntry1] : 39 yo F 9-months pregnant referred for evaluation of cardiac murmur diagnosed at age 11\par \par No cardiac complaints\par \par /82 Constitutional: no fever, chills, no recent weight loss, change in appetite or malaise  Eyes: no redness/discharge/pain/vision changes  ENT: no rhinorrhea/ear pain/sore throat  Cardiac: + palpitations/cp. No SOB or edema.  Respiratory: No cough or respiratory distress  GI: No nausea, vomiting, diarrhea or abdominal pain.  : No dysuria, frequency, urgency or hematuria  MS: no pain to back or extremities, no loss of ROM, no weakness  Neuro: No headache or weakness. No LOC.  Skin: No skin rash.  Endocrine: No history of thyroid disease or diabetes.  Except as documented in the HPI, all other systems are negative.

## 2022-12-05 NOTE — BRIEF OPERATIVE NOTE - PRE-OP DX
Abnormal labor  12/07/2018    Active  Dottie Macias  Chronic hypertension with superimposed pre-eclampsia  12/07/2018    Active  Dottie Macias  Diet controlled gestational diabetes mellitus (GDM) in third trimester  12/07/2018    Dottie Zhang  HIV (human immunodeficiency virus infection)  12/07/2018    Active  Dottie Macias 5

## 2022-12-15 NOTE — OB RN INTRAOPERATIVE NOTE - NS_DECISIONCESAREAN_OBGYN_ALL_OB_DT
LMTCB    OK for HUB to read.    Creatinine still elevated, plenty of water, avoid NSAIDs and recheck in 3 weeks, rest of the labs are ok    
07-Dec-2018 05:46
No

## 2023-12-03 NOTE — ASU PATIENT PROFILE, ADULT - NSICDXPASTSURGICALHX_GEN_ALL_CORE_FT
"    Patient Name: Carlos Paredes  : 1947  MRN: [id number]    Date of Admission: 2023  Date of Discharge:  12/3/2023  Primary Care Physician: Yas Sanchez Aundrea Cole M.D on Workstation: [id number]       XR Chest 1 View [[id number]] Krystian Lindsay M.D on Workstation: [id number]       Jemal Wall M.D on Workstation: [id number]       Sebastian Boogie M.D on Workstation: [id number]       Jemal Wall M.D on Workstation: [id number]       Riley Link M.D on Workstation: [id number]             Riley Strickland MD  [facility]  12/03/23  14:56 EST               PAST SURGICAL HISTORY:  No significant past surgical history

## 2024-08-29 ENCOUNTER — NON-APPOINTMENT (OUTPATIENT)
Age: 44
End: 2024-08-29